# Patient Record
Sex: FEMALE | Race: WHITE | Employment: UNEMPLOYED | URBAN - METROPOLITAN AREA
[De-identification: names, ages, dates, MRNs, and addresses within clinical notes are randomized per-mention and may not be internally consistent; named-entity substitution may affect disease eponyms.]

---

## 2017-01-23 ENCOUNTER — OFFICE VISIT (OUTPATIENT)
Dept: FAMILY MEDICINE CLINIC | Age: 62
End: 2017-01-23

## 2017-01-23 VITALS
HEIGHT: 61 IN | OXYGEN SATURATION: 95 % | HEART RATE: 72 BPM | RESPIRATION RATE: 20 BRPM | DIASTOLIC BLOOD PRESSURE: 66 MMHG | SYSTOLIC BLOOD PRESSURE: 94 MMHG | TEMPERATURE: 96.5 F

## 2017-01-23 DIAGNOSIS — J20.9 ACUTE BRONCHITIS, UNSPECIFIED ORGANISM: ICD-10-CM

## 2017-01-23 DIAGNOSIS — E13.9 DIABETES 1.5, MANAGED AS TYPE 2 (HCC): ICD-10-CM

## 2017-01-23 DIAGNOSIS — I10 ESSENTIAL HYPERTENSION: ICD-10-CM

## 2017-01-23 DIAGNOSIS — J01.00 ACUTE NON-RECURRENT MAXILLARY SINUSITIS: Primary | ICD-10-CM

## 2017-01-23 DIAGNOSIS — Z12.11 COLON CANCER SCREENING: ICD-10-CM

## 2017-01-23 PROCEDURE — 82274 ASSAY TEST FOR BLOOD FECAL: CPT | Performed by: NURSE PRACTITIONER

## 2017-01-23 PROCEDURE — 99213 OFFICE O/P EST LOW 20 MIN: CPT | Performed by: NURSE PRACTITIONER

## 2017-01-23 RX ORDER — GUAIFENESIN AND CODEINE PHOSPHATE 100; 10 MG/5ML; MG/5ML
5 SOLUTION ORAL EVERY 4 HOURS PRN
Qty: 120 ML | Refills: 0 | Status: SHIPPED | OUTPATIENT
Start: 2017-01-23 | End: 2017-01-30

## 2017-01-23 RX ORDER — CLARITHROMYCIN 500 MG/1
500 TABLET, COATED ORAL 2 TIMES DAILY
Qty: 20 TABLET | Refills: 0 | Status: SHIPPED | OUTPATIENT
Start: 2017-01-23 | End: 2017-02-02

## 2017-01-23 RX ORDER — ALBUTEROL SULFATE 90 UG/1
2 AEROSOL, METERED RESPIRATORY (INHALATION) EVERY 6 HOURS PRN
Qty: 1 INHALER | Refills: 3 | Status: SHIPPED | OUTPATIENT
Start: 2017-01-23 | End: 2020-11-09 | Stop reason: SDUPTHER

## 2017-02-09 ENCOUNTER — TELEPHONE (OUTPATIENT)
Dept: FAMILY MEDICINE CLINIC | Age: 62
End: 2017-02-09

## 2017-02-16 ENCOUNTER — TELEPHONE (OUTPATIENT)
Dept: FAMILY MEDICINE CLINIC | Age: 62
End: 2017-02-16

## 2017-02-17 RX ORDER — BENZONATATE 100 MG/1
100 CAPSULE ORAL 3 TIMES DAILY PRN
Qty: 30 CAPSULE | Refills: 0 | Status: SHIPPED | OUTPATIENT
Start: 2017-02-17 | End: 2018-05-17 | Stop reason: SDUPTHER

## 2017-03-02 RX ORDER — HYDROCHLOROTHIAZIDE 12.5 MG/1
CAPSULE, GELATIN COATED ORAL
Qty: 30 CAPSULE | Refills: 0 | Status: SHIPPED | OUTPATIENT
Start: 2017-03-02 | End: 2017-03-31 | Stop reason: SDUPTHER

## 2017-04-02 RX ORDER — METOPROLOL TARTRATE 100 MG/1
TABLET ORAL
Qty: 60 TABLET | Refills: 0 | Status: SHIPPED | OUTPATIENT
Start: 2017-04-02 | End: 2017-05-04 | Stop reason: SDUPTHER

## 2017-04-02 RX ORDER — HYDROCHLOROTHIAZIDE 12.5 MG/1
CAPSULE, GELATIN COATED ORAL
Qty: 30 CAPSULE | Refills: 0 | Status: SHIPPED | OUTPATIENT
Start: 2017-04-02 | End: 2017-05-04 | Stop reason: SDUPTHER

## 2017-05-04 ENCOUNTER — TELEPHONE (OUTPATIENT)
Dept: FAMILY MEDICINE CLINIC | Age: 62
End: 2017-05-04

## 2017-06-11 RX ORDER — METOPROLOL TARTRATE 100 MG/1
TABLET ORAL
Qty: 60 TABLET | Refills: 0 | Status: SHIPPED | OUTPATIENT
Start: 2017-06-11 | End: 2017-07-03 | Stop reason: SDUPTHER

## 2017-06-11 RX ORDER — HYDROCHLOROTHIAZIDE 12.5 MG/1
CAPSULE, GELATIN COATED ORAL
Qty: 30 CAPSULE | Refills: 0 | Status: SHIPPED | OUTPATIENT
Start: 2017-06-11 | End: 2017-07-03 | Stop reason: SDUPTHER

## 2017-06-26 ENCOUNTER — HOSPITAL ENCOUNTER (OUTPATIENT)
Dept: LAB | Age: 62
Discharge: HOME | End: 2017-06-26

## 2017-06-26 ENCOUNTER — OFFICE VISIT (OUTPATIENT)
Dept: FAMILY MEDICINE CLINIC | Age: 62
End: 2017-06-26

## 2017-06-26 VITALS
HEART RATE: 76 BPM | HEIGHT: 61 IN | WEIGHT: 179.6 LBS | BODY MASS INDEX: 33.91 KG/M2 | DIASTOLIC BLOOD PRESSURE: 78 MMHG | RESPIRATION RATE: 18 BRPM | TEMPERATURE: 97.9 F | SYSTOLIC BLOOD PRESSURE: 130 MMHG | OXYGEN SATURATION: 96 %

## 2017-06-26 DIAGNOSIS — J30.9 ALLERGIC RHINITIS, UNSPECIFIED ALLERGIC RHINITIS TRIGGER, UNSPECIFIED RHINITIS SEASONALITY: Chronic | ICD-10-CM

## 2017-06-26 DIAGNOSIS — E11.9 TYPE 2 DIABETES MELLITUS WITHOUT COMPLICATION, WITHOUT LONG-TERM CURRENT USE OF INSULIN (HCC): Chronic | ICD-10-CM

## 2017-06-26 DIAGNOSIS — K76.0 FATTY INFILTRATION OF LIVER: Chronic | ICD-10-CM

## 2017-06-26 DIAGNOSIS — I10 ESSENTIAL HYPERTENSION: Chronic | ICD-10-CM

## 2017-06-26 DIAGNOSIS — L03.211 CELLULITIS OF FACE: Primary | ICD-10-CM

## 2017-06-26 LAB
ALBUMIN SERPL-MCNC: 4.7 G/DL (ref 3.9–4.9)
ALP BLD-CCNC: 79 U/L (ref 40–130)
ALT SERPL-CCNC: 91 U/L (ref 0–33)
ANION GAP SERPL CALCULATED.3IONS-SCNC: 15 MEQ/L (ref 7–13)
AST SERPL-CCNC: 54 U/L (ref 0–35)
BASOPHILS ABSOLUTE: 0.1 K/UL (ref 0–0.2)
BASOPHILS RELATIVE PERCENT: 0.6 %
BILIRUB SERPL-MCNC: 0.5 MG/DL (ref 0–1.2)
BUN BLDV-MCNC: 14 MG/DL (ref 6–20)
CALCIUM SERPL-MCNC: 9.3 MG/DL (ref 8.6–10.2)
CHLORIDE BLD-SCNC: 97 MEQ/L (ref 98–107)
CHOLESTEROL, TOTAL: 209 MG/DL (ref 0–199)
CO2: 25 MEQ/L (ref 22–29)
CREAT SERPL-MCNC: 0.74 MG/DL (ref 0.5–0.9)
CREATININE URINE: 103.4 MG/DL
EOSINOPHILS ABSOLUTE: 0.6 K/UL (ref 0–0.7)
EOSINOPHILS RELATIVE PERCENT: 6.5 %
GFR AFRICAN AMERICAN: >60
GFR NON-AFRICAN AMERICAN: >60
GLOBULIN: 2.4 G/DL (ref 2.3–3.5)
GLUCOSE BLD-MCNC: 190 MG/DL (ref 74–109)
HBA1C MFR BLD: 7.6 % (ref 4.8–5.9)
HCT VFR BLD CALC: 42.8 % (ref 37–47)
HDLC SERPL-MCNC: 53 MG/DL (ref 40–59)
HEMOGLOBIN: 13.8 G/DL (ref 12–16)
LDL CHOLESTEROL CALCULATED: 122 MG/DL (ref 0–129)
LYMPHOCYTES ABSOLUTE: 0.8 K/UL (ref 1–4.8)
LYMPHOCYTES RELATIVE PERCENT: 9 %
MCH RBC QN AUTO: 28.6 PG (ref 27–31.3)
MCHC RBC AUTO-ENTMCNC: 32.2 % (ref 33–37)
MCV RBC AUTO: 88.7 FL (ref 82–100)
MICROALBUMIN UR-MCNC: <1.2 MG/DL
MICROALBUMIN/CREAT UR-RTO: NORMAL MG/G (ref 0–30)
MONOCYTES ABSOLUTE: 0.5 K/UL (ref 0.2–0.8)
MONOCYTES RELATIVE PERCENT: 5.5 %
NEUTROPHILS ABSOLUTE: 6.8 K/UL (ref 1.4–6.5)
NEUTROPHILS RELATIVE PERCENT: 78.4 %
PDW BLD-RTO: 15.4 % (ref 11.5–14.5)
PLATELET # BLD: 221 K/UL (ref 130–400)
POTASSIUM SERPL-SCNC: 4.2 MEQ/L (ref 3.5–5.1)
RBC # BLD: 4.83 M/UL (ref 4.2–5.4)
SLIDE REVIEW: ABNORMAL
SODIUM BLD-SCNC: 137 MEQ/L (ref 132–144)
TOTAL PROTEIN: 7.1 G/DL (ref 6.4–8.1)
TRIGL SERPL-MCNC: 170 MG/DL (ref 0–200)
WBC # BLD: 8.6 K/UL (ref 4.8–10.8)

## 2017-06-26 PROCEDURE — 36415 COLL VENOUS BLD VENIPUNCTURE: CPT

## 2017-06-26 PROCEDURE — 80061 LIPID PANEL: CPT

## 2017-06-26 PROCEDURE — 85025 COMPLETE CBC W/AUTO DIFF WBC: CPT

## 2017-06-26 PROCEDURE — 83036 HEMOGLOBIN GLYCOSYLATED A1C: CPT

## 2017-06-26 PROCEDURE — 82570 ASSAY OF URINE CREATININE: CPT

## 2017-06-26 PROCEDURE — 80053 COMPREHEN METABOLIC PANEL: CPT

## 2017-06-26 PROCEDURE — 82043 UR ALBUMIN QUANTITATIVE: CPT

## 2017-06-26 PROCEDURE — 99214 OFFICE O/P EST MOD 30 MIN: CPT | Performed by: FAMILY MEDICINE

## 2017-06-26 RX ORDER — SULFAMETHOXAZOLE AND TRIMETHOPRIM 800; 160 MG/1; MG/1
1 TABLET ORAL 2 TIMES DAILY
Qty: 20 TABLET | Refills: 0 | Status: SHIPPED | OUTPATIENT
Start: 2017-06-26 | End: 2017-07-06

## 2017-06-26 RX ORDER — IPRATROPIUM BROMIDE 42 UG/1
2 SPRAY, METERED NASAL 3 TIMES DAILY
Qty: 1 BOTTLE | Refills: 3 | Status: SHIPPED | OUTPATIENT
Start: 2017-06-26 | End: 2022-02-03

## 2017-06-26 ASSESSMENT — ENCOUNTER SYMPTOMS
SHORTNESS OF BREATH: 0
SINUS PRESSURE: 1
SORE THROAT: 1
CHEST TIGHTNESS: 0
EYE DISCHARGE: 0
NAUSEA: 1
VOMITING: 0
COUGH: 1
DIARRHEA: 0
ABDOMINAL PAIN: 0
WHEEZING: 0

## 2017-06-26 ASSESSMENT — PATIENT HEALTH QUESTIONNAIRE - PHQ9
SUM OF ALL RESPONSES TO PHQ QUESTIONS 1-9: 2
2. FEELING DOWN, DEPRESSED OR HOPELESS: 1
SUM OF ALL RESPONSES TO PHQ9 QUESTIONS 1 & 2: 2
1. LITTLE INTEREST OR PLEASURE IN DOING THINGS: 1

## 2017-06-28 DIAGNOSIS — E78.00 PURE HYPERCHOLESTEROLEMIA: Chronic | ICD-10-CM

## 2017-06-28 DIAGNOSIS — E11.9 TYPE 2 DIABETES MELLITUS WITHOUT COMPLICATION, WITHOUT LONG-TERM CURRENT USE OF INSULIN (HCC): Primary | Chronic | ICD-10-CM

## 2017-06-28 PROBLEM — E78.5 HYPERLIPIDEMIA: Chronic | Status: ACTIVE | Noted: 2017-06-28

## 2017-06-28 RX ORDER — ATORVASTATIN CALCIUM 10 MG/1
TABLET, FILM COATED ORAL
Qty: 12 TABLET | Refills: 5 | Status: SHIPPED | OUTPATIENT
Start: 2017-06-28 | End: 2017-09-25 | Stop reason: ALTCHOICE

## 2017-06-28 RX ORDER — GLIPIZIDE 2.5 MG/1
2.5 TABLET, EXTENDED RELEASE ORAL DAILY
Qty: 30 TABLET | Refills: 3 | Status: SHIPPED | OUTPATIENT
Start: 2017-06-28 | End: 2017-11-13 | Stop reason: SDUPTHER

## 2017-07-03 ENCOUNTER — OFFICE VISIT (OUTPATIENT)
Dept: FAMILY MEDICINE CLINIC | Age: 62
End: 2017-07-03

## 2017-07-03 VITALS
WEIGHT: 175 LBS | SYSTOLIC BLOOD PRESSURE: 122 MMHG | HEART RATE: 102 BPM | OXYGEN SATURATION: 92 % | BODY MASS INDEX: 33.07 KG/M2 | DIASTOLIC BLOOD PRESSURE: 76 MMHG

## 2017-07-03 DIAGNOSIS — E78.00 PURE HYPERCHOLESTEROLEMIA: Chronic | ICD-10-CM

## 2017-07-03 DIAGNOSIS — E11.9 TYPE 2 DIABETES MELLITUS WITHOUT COMPLICATION, WITHOUT LONG-TERM CURRENT USE OF INSULIN (HCC): Chronic | ICD-10-CM

## 2017-07-03 DIAGNOSIS — I10 ESSENTIAL HYPERTENSION: Chronic | ICD-10-CM

## 2017-07-03 DIAGNOSIS — H60.543 ECZEMA OF EXTERNAL EAR, BILATERAL: ICD-10-CM

## 2017-07-03 DIAGNOSIS — L03.211 CELLULITIS OF FACE: Primary | ICD-10-CM

## 2017-07-03 PROBLEM — H60.549 ECZEMA OF EXTERNAL EAR: Chronic | Status: ACTIVE | Noted: 2017-07-03

## 2017-07-03 PROBLEM — H60.549 ECZEMA OF EXTERNAL EAR: Status: ACTIVE | Noted: 2017-07-03

## 2017-07-03 PROCEDURE — 99214 OFFICE O/P EST MOD 30 MIN: CPT | Performed by: FAMILY MEDICINE

## 2017-07-03 RX ORDER — MOMETASONE FUROATE 1 MG/G
OINTMENT TOPICAL
Qty: 45 G | Refills: 3 | Status: SHIPPED | OUTPATIENT
Start: 2017-07-03 | End: 2019-01-31 | Stop reason: SDUPTHER

## 2017-07-03 RX ORDER — HYDROCHLOROTHIAZIDE 12.5 MG/1
12.5 CAPSULE, GELATIN COATED ORAL EVERY MORNING
Qty: 30 CAPSULE | Refills: 5 | Status: SHIPPED | OUTPATIENT
Start: 2017-07-03 | End: 2021-02-15 | Stop reason: SDUPTHER

## 2017-07-03 RX ORDER — METOPROLOL TARTRATE 100 MG/1
100 TABLET ORAL 2 TIMES DAILY
Qty: 60 TABLET | Refills: 5 | Status: SHIPPED | OUTPATIENT
Start: 2017-07-03 | End: 2018-05-17 | Stop reason: SDUPTHER

## 2017-07-03 ASSESSMENT — ENCOUNTER SYMPTOMS
DIARRHEA: 0
COUGH: 0
ABDOMINAL PAIN: 0
VOMITING: 0
CHEST TIGHTNESS: 0
NAUSEA: 0
WHEEZING: 0
SHORTNESS OF BREATH: 0

## 2017-07-28 ENCOUNTER — TELEPHONE (OUTPATIENT)
Dept: FAMILY MEDICINE CLINIC | Age: 62
End: 2017-07-28

## 2017-07-28 DIAGNOSIS — R05.9 COUGH: Primary | ICD-10-CM

## 2017-07-28 RX ORDER — BROMPHENIRAMINE MALEATE, PSEUDOEPHEDRINE HYDROCHLORIDE, AND DEXTROMETHORPHAN HYDROBROMIDE 2; 30; 10 MG/5ML; MG/5ML; MG/5ML
10 SYRUP ORAL EVERY 4 HOURS PRN
Qty: 473 ML | Refills: 0 | Status: SHIPPED | OUTPATIENT
Start: 2017-07-28 | End: 2017-09-25 | Stop reason: ALTCHOICE

## 2017-09-21 ENCOUNTER — OFFICE VISIT (OUTPATIENT)
Dept: FAMILY MEDICINE CLINIC | Age: 62
End: 2017-09-21

## 2017-09-21 VITALS
HEIGHT: 61 IN | WEIGHT: 178.5 LBS | BODY MASS INDEX: 33.7 KG/M2 | DIASTOLIC BLOOD PRESSURE: 80 MMHG | TEMPERATURE: 97.3 F | SYSTOLIC BLOOD PRESSURE: 140 MMHG | RESPIRATION RATE: 14 BRPM | HEART RATE: 76 BPM | OXYGEN SATURATION: 94 %

## 2017-09-21 DIAGNOSIS — H60.543 ECZEMA OF EXTERNAL EAR, BILATERAL: Primary | ICD-10-CM

## 2017-09-21 PROCEDURE — 99212 OFFICE O/P EST SF 10 MIN: CPT | Performed by: PHYSICIAN ASSISTANT

## 2017-09-21 RX ORDER — NYSTATIN 100000 U/G
CREAM TOPICAL
Qty: 1 TUBE | Refills: 0 | Status: SHIPPED | OUTPATIENT
Start: 2017-09-21 | End: 2020-11-09 | Stop reason: ALTCHOICE

## 2017-09-25 ENCOUNTER — OFFICE VISIT (OUTPATIENT)
Dept: FAMILY MEDICINE CLINIC | Age: 62
End: 2017-09-25

## 2017-09-25 VITALS
BODY MASS INDEX: 32.85 KG/M2 | TEMPERATURE: 98 F | SYSTOLIC BLOOD PRESSURE: 128 MMHG | HEIGHT: 61 IN | DIASTOLIC BLOOD PRESSURE: 77 MMHG | OXYGEN SATURATION: 98 % | WEIGHT: 174 LBS | HEART RATE: 77 BPM | RESPIRATION RATE: 20 BRPM

## 2017-09-25 DIAGNOSIS — L08.9 SKIN INFECTION: ICD-10-CM

## 2017-09-25 DIAGNOSIS — L24.9 IRRITANT CONTACT DERMATITIS, UNSPECIFIED TRIGGER: Primary | ICD-10-CM

## 2017-09-25 PROCEDURE — 99213 OFFICE O/P EST LOW 20 MIN: CPT | Performed by: NURSE PRACTITIONER

## 2017-09-25 RX ORDER — SULFAMETHOXAZOLE AND TRIMETHOPRIM 800; 160 MG/1; MG/1
1 TABLET ORAL 2 TIMES DAILY
Qty: 20 TABLET | Refills: 0 | Status: SHIPPED | OUTPATIENT
Start: 2017-09-25 | End: 2017-10-17 | Stop reason: SDUPTHER

## 2017-09-25 RX ORDER — TRIAMCINOLONE ACETONIDE 1 MG/G
CREAM TOPICAL
Qty: 45 G | Refills: 1 | Status: SHIPPED | OUTPATIENT
Start: 2017-09-25 | End: 2020-11-09 | Stop reason: ALTCHOICE

## 2017-09-25 RX ORDER — GREEN TEA/HOODIA GORDONII 315-12.5MG
1 CAPSULE ORAL 2 TIMES DAILY
Qty: 60 TABLET | Refills: 0 | Status: SHIPPED | OUTPATIENT
Start: 2017-09-25

## 2017-09-25 RX ORDER — PREDNISONE 20 MG/1
TABLET ORAL
Qty: 20 TABLET | Refills: 0 | Status: SHIPPED | OUTPATIENT
Start: 2017-09-25 | End: 2017-10-05

## 2017-10-17 DIAGNOSIS — L08.9 SKIN INFECTION: ICD-10-CM

## 2017-10-18 RX ORDER — SULFAMETHOXAZOLE AND TRIMETHOPRIM 800; 160 MG/1; MG/1
1 TABLET ORAL 2 TIMES DAILY
Qty: 20 TABLET | Refills: 0 | Status: SHIPPED | OUTPATIENT
Start: 2017-10-18 | End: 2017-10-28

## 2017-11-13 DIAGNOSIS — E11.9 TYPE 2 DIABETES MELLITUS WITHOUT COMPLICATION, WITHOUT LONG-TERM CURRENT USE OF INSULIN (HCC): Chronic | ICD-10-CM

## 2017-11-13 RX ORDER — GLIPIZIDE 2.5 MG/1
TABLET, EXTENDED RELEASE ORAL
Qty: 30 TABLET | Refills: 2 | Status: SHIPPED | OUTPATIENT
Start: 2017-11-13 | End: 2018-03-03 | Stop reason: SDUPTHER

## 2017-12-18 ENCOUNTER — TELEPHONE (OUTPATIENT)
Dept: FAMILY MEDICINE CLINIC | Age: 62
End: 2017-12-18

## 2017-12-18 DIAGNOSIS — G89.29 CHRONIC RIGHT SHOULDER PAIN: Primary | ICD-10-CM

## 2017-12-18 DIAGNOSIS — M25.511 CHRONIC RIGHT SHOULDER PAIN: Primary | ICD-10-CM

## 2017-12-18 NOTE — TELEPHONE ENCOUNTER
Pt is wanting an order for xray of the rt shoulder,  She has an appointment next month to talk to you about it but wants to have xray first.

## 2017-12-19 ENCOUNTER — HOSPITAL ENCOUNTER (OUTPATIENT)
Age: 62
Discharge: HOME | End: 2017-12-19

## 2017-12-19 ENCOUNTER — HOSPITAL ENCOUNTER (OUTPATIENT)
Dept: GENERAL RADIOLOGY | Age: 62
Discharge: HOME | End: 2017-12-19

## 2017-12-19 DIAGNOSIS — G89.29 CHRONIC RIGHT SHOULDER PAIN: ICD-10-CM

## 2017-12-19 DIAGNOSIS — M25.511 CHRONIC RIGHT SHOULDER PAIN: ICD-10-CM

## 2017-12-19 PROCEDURE — 73030 X-RAY EXAM OF SHOULDER: CPT

## 2017-12-21 NOTE — PROGRESS NOTES
Please notify [facility] that shoulder x-ray shows an abnormal area that may be a form of tendinitis or possible bone spur. Let me know if she would like referral to orthopedics and I can order. Otherwise, she can follow up with me.

## 2017-12-27 ENCOUNTER — TELEPHONE (OUTPATIENT)
Dept: FAMILY MEDICINE CLINIC | Age: 62
End: 2017-12-27

## 2017-12-28 NOTE — TELEPHONE ENCOUNTER
Patient called back and said she cant take any anti inflammatory they make her sick and this should be in her chart that she cannot take these she does have muscle relaxer Flexeril to help her sleep wanted to know if this is fine to take and if she could go get something at the store for arthritis like tylenol. cp

## 2017-12-28 NOTE — TELEPHONE ENCOUNTER
Yes the flexeril is fine to take. Tylenol is fine to take she can take up to 4,000 mg per day. I can add NSAIDS to her allergy list if she would like.

## 2018-01-02 ENCOUNTER — APPOINTMENT (OUTPATIENT)
Dept: GENERAL RADIOLOGY | Age: 63
End: 2018-01-02

## 2018-01-02 ENCOUNTER — HOSPITAL ENCOUNTER (EMERGENCY)
Age: 63
Discharge: HOME | End: 2018-01-02
Attending: EMERGENCY MEDICINE

## 2018-01-02 VITALS
OXYGEN SATURATION: 95 % | WEIGHT: 170 LBS | DIASTOLIC BLOOD PRESSURE: 74 MMHG | TEMPERATURE: 98.4 F | SYSTOLIC BLOOD PRESSURE: 147 MMHG | BODY MASS INDEX: 32.1 KG/M2 | HEART RATE: 85 BPM | HEIGHT: 61 IN | RESPIRATION RATE: 16 BRPM

## 2018-01-02 DIAGNOSIS — S93.402A MODERATE LEFT ANKLE SPRAIN, INITIAL ENCOUNTER: Primary | ICD-10-CM

## 2018-01-02 PROCEDURE — 99283 EMERGENCY DEPT VISIT LOW MDM: CPT

## 2018-01-02 PROCEDURE — 73610 X-RAY EXAM OF ANKLE: CPT

## 2018-01-02 ASSESSMENT — PAIN DESCRIPTION - DESCRIPTORS
DESCRIPTORS: ACHING;BURNING
DESCRIPTORS: ACHING;BURNING

## 2018-01-02 ASSESSMENT — PAIN DESCRIPTION - ORIENTATION
ORIENTATION: LEFT
ORIENTATION: LEFT

## 2018-01-02 ASSESSMENT — PAIN DESCRIPTION - LOCATION
LOCATION: ANKLE
LOCATION: ANKLE

## 2018-01-02 ASSESSMENT — PAIN DESCRIPTION - FREQUENCY
FREQUENCY: INTERMITTENT
FREQUENCY: INTERMITTENT

## 2018-01-02 ASSESSMENT — PAIN DESCRIPTION - PAIN TYPE
TYPE: ACUTE PAIN
TYPE: ACUTE PAIN

## 2018-01-02 ASSESSMENT — PAIN SCALES - GENERAL
PAINLEVEL_OUTOF10: 6
PAINLEVEL_OUTOF10: 6

## 2018-01-02 NOTE — ED NOTES
Discharge instructions given. Patient verbalizes understanding and denies any questions. Patient discharged via wheelchair accompanied by spouse.         Rojas Brown RN  01/02/18 [id number]

## 2018-01-02 NOTE — ED PROVIDER NOTES
ALBUTEROL SULFATE HFA (PROVENTIL HFA) 108 (90 BASE) MCG/ACT INHALER    Inhale 2 puffs into the lungs every 6 hours as needed for Wheezing    CHOLECALCIFEROL (VITAMIN D3) 5000 UNITS TABS    Take 5,000 Units by mouth daily    CYCLOBENZAPRINE (FLEXERIL) 10 MG TABLET    Take 1 tablet by mouth 2 times daily as needed for Muscle spasms    FISH OIL    1,000 mg by Does not apply route daily. GLIPIZIDE XL 2.5 MG EXTENDED RELEASE TABLET    TAKE ONE TABLET BY MOUTH DAILY    HYDROCHLOROTHIAZIDE (MICROZIDE) 12.5 MG CAPSULE    Take 1 capsule by mouth every morning    IBUPROFEN (ADVIL;MOTRIN) 800 MG TABLET    Take 1 tablet by mouth every 6 hours as needed for Pain    IPRATROPIUM (ATROVENT) 0.06 % NASAL SPRAY    2 sprays by Nasal route 3 times daily    LACTOBACILLUS (PROBIOTIC ACIDOPHILUS) TABS    Take 1 tablet by mouth 2 times daily    METFORMIN (GLUCOPHAGE) 500 MG TABLET    Take 2 tablets by mouth 2 times daily (with meals)    METOPROLOL (LOPRESSOR) 100 MG TABLET    Take 1 tablet by mouth 2 times daily    MOMETASONE (ASMANEX 30 METERED DOSES) 220 MCG/INH INHALER    Inhale 1 puff into the lungs daily    MOMETASONE (ELOCON) 0.1 % OINTMENT    Apply topically daily. NYSTATIN (MYCOSTATIN) 779604 UNIT/GM CREAM    Apply topically 2 times daily. SERTRALINE (ZOLOFT) 100 MG TABLET    TAKE 1/2 TO 1 TABLET BY MOUTH EVERY DAY    TRAMADOL (ULTRAM) 50 MG TABLET    Take 50 mg by mouth every 6 hours as needed for Pain    TRIAMCINOLONE (KENALOG) 0.1 % CREAM    Apply topically 2 times daily. ALLERGIES     Pcn [penicillins];  Penicillin g; Statins; and Nsaids    FAMILY HISTORY       Family History   Problem Relation Age of Onset    Adopted: Yes          SOCIAL HISTORY       Social History     Social History    Marital status:      Spouse name: N/A    Number of children: N/A    Years of education: N/A     Social History Main Topics    Smoking status: Former Smoker     Packs/day: 2.00     Years: 30.00     Quit date: 1/1/2010    Smokeless tobacco: Never Used    Alcohol use No    Drug use: No    Sexual activity: Not Asked     Other Topics Concern    None     Social History Narrative    None       SCREENINGS             PHYSICAL EXAM    (up to 7 for level 4, 8 or more for level 5)     ED Triage Vitals   BP Temp Temp Source Pulse Resp SpO2 Height Weight   01/02/18 1232 01/02/18 1236 01/02/18 1236 01/02/18 1232 01/02/18 1232 01/02/18 1232 01/02/18 1236 01/02/18 1236   (!) 164/76 98.2 °F (36.8 °C) Oral 92 16 94 % 5' 1\" (1.549 m) 170 lb (77.1 kg)       Physical Exam  This is a 12year-old female without distress. Patient has swelling and pain lateral malleolar region. Minimal pain to the dorsal foot but not related fifth metatarsal.  No pain medial.  No pain to the Achilles. No skin wounds. No visible bruising. Neurologic and vascular status intact. No pain above the ankle mortise. DIAGNOSTIC RESULTS     EKG: All EKG's are interpreted by the Emergency Department Physician who either signs or Co-signs this chart in the absence of a cardiologist.        RADIOLOGY:   Non-plain film images such as CT, Ultrasound and MRI are read by the radiologist. Plain radiographic images are visualized and preliminarily interpreted by the emergency physician with the below findings:    X-ray of the left ankle reveals a small loose body near the talus. Patient states she is aware of previous injury. This appears chronic without acute fracture as interpreted by myself. Interpretation per the Radiologist below, if available at the time of this note:    XR ANKLE LEFT (MIN 3 VIEWS)    (Results Pending)         ED BEDSIDE ULTRASOUND:   Performed by ED Physician - none    LABS:  Labs Reviewed - No data to display    All other labs were within normal range or not returned as of this dictation.     EMERGENCY DEPARTMENT COURSE and DIFFERENTIAL DIAGNOSIS/MDM:   Vitals:    Vitals:    01/02/18 1232 01/02/18 1236   BP: (!) 164/76    Pulse: 92 Resp: 16    Temp:  98.2 °F (36.8 °C)   TempSrc:  Oral   SpO2: 94%    Weight:  170 lb (77.1 kg)   Height:  5' 1\" (1.549 m)       Patient has ankle sprain. She has asked her home which utilize if needed. Ace wrap placed in the ED. I stressed elevation, ice and rest.    MDM    CRITICAL CARE TIME   Total Critical Care time was  minutes, excluding separately reportable procedures. There was a high probability of clinically significant/life threatening deterioration in the patient's condition which required my urgent intervention. CONSULTS:  None    PROCEDURES:  Unless otherwise noted below, none     Procedures    FINAL IMPRESSION      1.  Moderate left ankle sprain, initial encounter          DISPOSITION/PLAN   DISPOSITION Decision To Discharge 01/02/2018 01:31:57 PM      PATIENT REFERRED TO:  Joan Estrada CNP  [facility] [address]  [phone]      As needed      DISCHARGE MEDICATIONS:  New Prescriptions    No medications on file          (Please note that portions of this note were completed with a voice recognition program.  Efforts were made to edit the dictations but occasionally words are mis-transcribed.)    Briseyda Lama MD (electronically signed)  Attending Emergency Physician          Byron Kahn MD  01/02/18 0772

## 2018-01-04 RX ORDER — SERTRALINE HYDROCHLORIDE 100 MG/1
TABLET, FILM COATED ORAL
Qty: 90 TABLET | Refills: 0 | Status: SHIPPED | OUTPATIENT
Start: 2018-01-04 | End: 2019-01-31 | Stop reason: SDUPTHER

## 2018-01-04 RX ORDER — SERTRALINE HYDROCHLORIDE 100 MG/1
TABLET, FILM COATED ORAL
Qty: 90 TABLET | Refills: 2 | Status: SHIPPED | OUTPATIENT
Start: 2018-01-04 | End: 2018-05-17 | Stop reason: SDUPTHER

## 2018-01-04 RX ORDER — METOPROLOL TARTRATE 100 MG/1
TABLET ORAL
Qty: 60 TABLET | Refills: 0 | Status: SHIPPED | OUTPATIENT
Start: 2018-01-04 | End: 2018-03-02 | Stop reason: SDUPTHER

## 2018-01-04 RX ORDER — HYDROCHLOROTHIAZIDE 12.5 MG/1
CAPSULE, GELATIN COATED ORAL
Qty: 30 CAPSULE | Refills: 0 | Status: SHIPPED | OUTPATIENT
Start: 2018-01-04 | End: 2018-03-02 | Stop reason: SDUPTHER

## 2018-01-25 ENCOUNTER — OFFICE VISIT (OUTPATIENT)
Dept: FAMILY MEDICINE CLINIC | Age: 63
End: 2018-01-25

## 2018-01-25 VITALS
SYSTOLIC BLOOD PRESSURE: 124 MMHG | HEART RATE: 86 BPM | BODY MASS INDEX: 33.99 KG/M2 | HEIGHT: 61 IN | DIASTOLIC BLOOD PRESSURE: 78 MMHG | RESPIRATION RATE: 14 BRPM | OXYGEN SATURATION: 92 % | WEIGHT: 180 LBS | TEMPERATURE: 98.4 F

## 2018-01-25 DIAGNOSIS — G89.29 CHRONIC RIGHT SHOULDER PAIN: Primary | ICD-10-CM

## 2018-01-25 DIAGNOSIS — M25.572 CHRONIC PAIN OF LEFT ANKLE: ICD-10-CM

## 2018-01-25 DIAGNOSIS — G89.29 CHRONIC PAIN OF LEFT ANKLE: ICD-10-CM

## 2018-01-25 DIAGNOSIS — M25.511 CHRONIC RIGHT SHOULDER PAIN: Primary | ICD-10-CM

## 2018-01-25 DIAGNOSIS — Z12.31 ENCOUNTER FOR SCREENING MAMMOGRAM FOR BREAST CANCER: ICD-10-CM

## 2018-01-25 DIAGNOSIS — E11.9 TYPE 2 DIABETES MELLITUS WITHOUT COMPLICATION, WITHOUT LONG-TERM CURRENT USE OF INSULIN (HCC): Chronic | ICD-10-CM

## 2018-01-25 DIAGNOSIS — Z12.11 SCREENING FOR COLON CANCER: ICD-10-CM

## 2018-01-25 PROCEDURE — 99214 OFFICE O/P EST MOD 30 MIN: CPT | Performed by: NURSE PRACTITIONER

## 2018-01-25 RX ORDER — TRAMADOL HYDROCHLORIDE 50 MG/1
50 TABLET ORAL EVERY 6 HOURS PRN
Qty: 50 TABLET | Refills: 0 | Status: SHIPPED | OUTPATIENT
Start: 2018-01-25 | End: 2018-02-24

## 2018-02-06 ENCOUNTER — HOSPITAL ENCOUNTER (OUTPATIENT)
Dept: MRI IMAGING | Age: 63
Discharge: HOME | End: 2018-02-08

## 2018-02-06 DIAGNOSIS — G89.29 CHRONIC RIGHT SHOULDER PAIN: ICD-10-CM

## 2018-02-06 DIAGNOSIS — M25.511 CHRONIC RIGHT SHOULDER PAIN: ICD-10-CM

## 2018-02-06 PROCEDURE — 73221 MRI JOINT UPR EXTREM W/O DYE: CPT

## 2018-02-07 NOTE — PROGRESS NOTES
Please notify Bia Back that MRI of the shoulder is abnormal. Does she have any appointment with orthopedic coming up? Shoulder may require surgery and so a referral is advised.

## 2018-02-08 ENCOUNTER — TELEPHONE (OUTPATIENT)
Dept: FAMILY MEDICINE CLINIC | Age: 63
End: 2018-02-08

## 2018-02-08 DIAGNOSIS — R93.6 ABNORMAL MRI, SHOULDER: Primary | ICD-10-CM

## 2018-02-08 NOTE — TELEPHONE ENCOUNTER
----- Message from Ag Barrett sent at 2/8/2018  9:09 AM EST -----  Patient called back, advised patient of result, patient would like to be seen in [place] by orthopedic doctor, requested to speak to clinical staff.

## 2018-02-09 NOTE — TELEPHONE ENCOUNTER
Patient is aware. I have given her information needed to set up appointment. I have faxed over referral and information to Dr. George Candelario.

## 2018-02-23 DIAGNOSIS — M25.511 CHRONIC RIGHT SHOULDER PAIN: Primary | ICD-10-CM

## 2018-02-23 DIAGNOSIS — G89.29 CHRONIC RIGHT SHOULDER PAIN: Primary | ICD-10-CM

## 2018-02-23 RX ORDER — TRAMADOL HYDROCHLORIDE 50 MG/1
50 TABLET ORAL EVERY 6 HOURS PRN
Qty: 90 TABLET | Refills: 0 | Status: SHIPPED | OUTPATIENT
Start: 2018-02-23 | End: 2018-03-25

## 2018-03-02 RX ORDER — HYDROCHLOROTHIAZIDE 12.5 MG/1
CAPSULE, GELATIN COATED ORAL
Qty: 30 CAPSULE | Refills: 0 | Status: SHIPPED | OUTPATIENT
Start: 2018-03-02 | End: 2018-04-02 | Stop reason: SDUPTHER

## 2018-03-02 RX ORDER — METOPROLOL TARTRATE 100 MG/1
TABLET ORAL
Qty: 60 TABLET | Refills: 0 | Status: SHIPPED | OUTPATIENT
Start: 2018-03-02 | End: 2018-04-02 | Stop reason: SDUPTHER

## 2018-03-03 DIAGNOSIS — E11.9 TYPE 2 DIABETES MELLITUS WITHOUT COMPLICATION, WITHOUT LONG-TERM CURRENT USE OF INSULIN (HCC): Chronic | ICD-10-CM

## 2018-03-05 RX ORDER — GLIPIZIDE 2.5 MG/1
TABLET, EXTENDED RELEASE ORAL
Qty: 16 TABLET | Refills: 1 | Status: SHIPPED | OUTPATIENT
Start: 2018-03-05 | End: 2019-01-31 | Stop reason: SDUPTHER

## 2018-04-02 RX ORDER — HYDROCHLOROTHIAZIDE 12.5 MG/1
CAPSULE, GELATIN COATED ORAL
Qty: 30 CAPSULE | Refills: 0 | Status: SHIPPED | OUTPATIENT
Start: 2018-04-02 | End: 2018-05-17 | Stop reason: SDUPTHER

## 2018-04-02 RX ORDER — METOPROLOL TARTRATE 100 MG/1
TABLET ORAL
Qty: 60 TABLET | Refills: 0 | Status: SHIPPED | OUTPATIENT
Start: 2018-04-02 | End: 2018-05-01 | Stop reason: SDUPTHER

## 2018-05-01 DIAGNOSIS — E11.9 TYPE 2 DIABETES MELLITUS WITHOUT COMPLICATION, WITHOUT LONG-TERM CURRENT USE OF INSULIN (HCC): Chronic | ICD-10-CM

## 2018-05-01 RX ORDER — GLIPIZIDE 2.5 MG/1
TABLET, EXTENDED RELEASE ORAL
Qty: 30 TABLET | Refills: 2 | Status: SHIPPED | OUTPATIENT
Start: 2018-05-01 | End: 2018-08-02 | Stop reason: SDUPTHER

## 2018-05-01 RX ORDER — HYDROCHLOROTHIAZIDE 12.5 MG/1
CAPSULE, GELATIN COATED ORAL
Qty: 30 CAPSULE | Refills: 2 | Status: SHIPPED | OUTPATIENT
Start: 2018-05-01 | End: 2018-08-02 | Stop reason: SDUPTHER

## 2018-05-01 RX ORDER — METOPROLOL TARTRATE 100 MG/1
TABLET ORAL
Qty: 60 TABLET | Refills: 2 | Status: SHIPPED | OUTPATIENT
Start: 2018-05-01 | End: 2018-08-02 | Stop reason: SDUPTHER

## 2018-05-17 ENCOUNTER — OFFICE VISIT (OUTPATIENT)
Dept: FAMILY MEDICINE CLINIC | Age: 63
End: 2018-05-17

## 2018-05-17 VITALS
HEIGHT: 61 IN | SYSTOLIC BLOOD PRESSURE: 90 MMHG | OXYGEN SATURATION: 96 % | TEMPERATURE: 97.5 F | WEIGHT: 175 LBS | DIASTOLIC BLOOD PRESSURE: 58 MMHG | BODY MASS INDEX: 33.04 KG/M2 | RESPIRATION RATE: 18 BRPM | HEART RATE: 69 BPM

## 2018-05-17 DIAGNOSIS — R05.9 COUGH: ICD-10-CM

## 2018-05-17 DIAGNOSIS — E11.9 TYPE 2 DIABETES MELLITUS WITHOUT COMPLICATION, WITHOUT LONG-TERM CURRENT USE OF INSULIN (HCC): Primary | Chronic | ICD-10-CM

## 2018-05-17 DIAGNOSIS — G89.29 CHRONIC RIGHT SHOULDER PAIN: ICD-10-CM

## 2018-05-17 DIAGNOSIS — F43.0 STRESS REACTION: ICD-10-CM

## 2018-05-17 DIAGNOSIS — I10 ESSENTIAL HYPERTENSION: Chronic | ICD-10-CM

## 2018-05-17 DIAGNOSIS — M25.511 CHRONIC RIGHT SHOULDER PAIN: ICD-10-CM

## 2018-05-17 DIAGNOSIS — E78.00 PURE HYPERCHOLESTEROLEMIA: Chronic | ICD-10-CM

## 2018-05-17 DIAGNOSIS — M62.830 MUSCLE SPASM OF BACK: ICD-10-CM

## 2018-05-17 LAB — HBA1C MFR BLD: 6.4 %

## 2018-05-17 PROCEDURE — 83036 HEMOGLOBIN GLYCOSYLATED A1C: CPT | Performed by: NURSE PRACTITIONER

## 2018-05-17 PROCEDURE — 99214 OFFICE O/P EST MOD 30 MIN: CPT | Performed by: NURSE PRACTITIONER

## 2018-05-17 RX ORDER — BENZONATATE 100 MG/1
100 CAPSULE ORAL 3 TIMES DAILY PRN
Qty: 30 CAPSULE | Refills: 0 | Status: SHIPPED | OUTPATIENT
Start: 2018-05-17 | End: 2018-05-24

## 2018-05-17 RX ORDER — SERTRALINE HYDROCHLORIDE 100 MG/1
TABLET, FILM COATED ORAL
Qty: 90 TABLET | Refills: 2 | Status: SHIPPED | OUTPATIENT
Start: 2018-05-17 | End: 2018-10-02 | Stop reason: SDUPTHER

## 2018-05-17 RX ORDER — METOPROLOL TARTRATE 50 MG/1
TABLET, FILM COATED ORAL
Qty: 60 TABLET | Refills: 2 | Status: CANCELLED | OUTPATIENT
Start: 2018-05-17

## 2018-05-17 RX ORDER — CYCLOBENZAPRINE HCL 10 MG
10 TABLET ORAL 2 TIMES DAILY PRN
Qty: 40 TABLET | Refills: 1 | Status: SHIPPED | OUTPATIENT
Start: 2018-05-17 | End: 2020-03-26 | Stop reason: SDUPTHER

## 2018-05-17 RX ORDER — TRAZODONE HYDROCHLORIDE 50 MG/1
50 TABLET ORAL NIGHTLY
Qty: 30 TABLET | Refills: 2 | Status: SHIPPED | OUTPATIENT
Start: 2018-05-17 | End: 2018-09-02 | Stop reason: SDUPTHER

## 2018-05-21 ENCOUNTER — TELEPHONE (OUTPATIENT)
Dept: FAMILY MEDICINE CLINIC | Age: 63
End: 2018-05-21

## 2018-08-02 DIAGNOSIS — E11.9 TYPE 2 DIABETES MELLITUS WITHOUT COMPLICATION, WITHOUT LONG-TERM CURRENT USE OF INSULIN (HCC): Chronic | ICD-10-CM

## 2018-08-02 RX ORDER — HYDROCHLOROTHIAZIDE 12.5 MG/1
12.5 CAPSULE, GELATIN COATED ORAL EVERY EVENING
Qty: 30 CAPSULE | Refills: 5 | Status: SHIPPED | OUTPATIENT
Start: 2018-08-02 | End: 2019-01-31 | Stop reason: SDUPTHER

## 2018-08-02 RX ORDER — GLIPIZIDE 2.5 MG/1
2.5 TABLET, EXTENDED RELEASE ORAL DAILY
Qty: 30 TABLET | Refills: 5 | Status: SHIPPED | OUTPATIENT
Start: 2018-08-02 | End: 2019-02-05 | Stop reason: SDUPTHER

## 2018-08-02 RX ORDER — METOPROLOL TARTRATE 100 MG/1
100 TABLET ORAL 2 TIMES DAILY
Qty: 60 TABLET | Refills: 5 | Status: SHIPPED | OUTPATIENT
Start: 2018-08-02 | End: 2019-01-31 | Stop reason: SDUPTHER

## 2018-09-02 RX ORDER — TRAZODONE HYDROCHLORIDE 50 MG/1
50 TABLET ORAL NIGHTLY
Qty: 30 TABLET | Refills: 1 | Status: SHIPPED | OUTPATIENT
Start: 2018-09-02 | End: 2018-10-02 | Stop reason: SDUPTHER

## 2018-09-02 NOTE — TELEPHONE ENCOUNTER
pharmacy requesting medication refill. Please approve or deny this request.    Rx requested:  Requested Prescriptions     Pending Prescriptions Disp Refills    traZODone (DESYREL) 50 MG tablet [Pharmacy Med Name: TraZODone HCl Oral Tablet 50 MG] 30 tablet 1     Sig: take 1 tablet by mouth nightly       Last Office Visit:   5/17/2018    Last Labs:  6/26/18    Next Visit Date:  No future appointments.

## 2018-10-04 RX ORDER — TRAZODONE HYDROCHLORIDE 50 MG/1
50 TABLET ORAL NIGHTLY
Qty: 30 TABLET | Refills: 0 | Status: SHIPPED | OUTPATIENT
Start: 2018-10-04 | End: 2018-11-04 | Stop reason: SDUPTHER

## 2018-10-04 RX ORDER — SERTRALINE HYDROCHLORIDE 100 MG/1
TABLET, FILM COATED ORAL
Qty: 60 TABLET | Refills: 0 | Status: SHIPPED | OUTPATIENT
Start: 2018-10-04 | End: 2019-02-05 | Stop reason: ALTCHOICE

## 2018-10-08 NOTE — TELEPHONE ENCOUNTER
Pharmacy is requesting medication refill. Please approve or deny this request.    Rx requested:  Requested Prescriptions     Pending Prescriptions Disp Refills    metFORMIN (GLUCOPHAGE) 500 MG tablet [Pharmacy Med Name: MetFORMIN HCl Oral Tablet 500 MG] 120 tablet 10     Sig: TAKE TWO TABLETS BY MOUTH TWO TIMES A DAY WITH MEALS       Last Office Visit:   5-17-18    Last Labs:  5-17-18      Next Visit Date:  No future appointments.

## 2018-10-12 ENCOUNTER — TELEPHONE (OUTPATIENT)
Dept: FAMILY MEDICINE CLINIC | Age: 63
End: 2018-10-12

## 2018-11-05 RX ORDER — TRAZODONE HYDROCHLORIDE 50 MG/1
50 TABLET ORAL NIGHTLY
Qty: 30 TABLET | Refills: 2 | Status: SHIPPED | OUTPATIENT
Start: 2018-11-05 | End: 2019-02-03 | Stop reason: SDUPTHER

## 2018-11-29 ENCOUNTER — TELEPHONE (OUTPATIENT)
Dept: FAMILY MEDICINE CLINIC | Age: 63
End: 2018-11-29

## 2019-01-31 ENCOUNTER — TELEPHONE (OUTPATIENT)
Dept: FAMILY MEDICINE CLINIC | Age: 64
End: 2019-01-31

## 2019-01-31 DIAGNOSIS — H60.543 ECZEMA OF EXTERNAL EAR, BILATERAL: ICD-10-CM

## 2019-01-31 DIAGNOSIS — E11.9 TYPE 2 DIABETES MELLITUS WITHOUT COMPLICATION, WITHOUT LONG-TERM CURRENT USE OF INSULIN (HCC): Chronic | ICD-10-CM

## 2019-01-31 RX ORDER — GLIPIZIDE 2.5 MG/1
TABLET, EXTENDED RELEASE ORAL
Qty: 90 TABLET | Refills: 0 | Status: SHIPPED | OUTPATIENT
Start: 2019-01-31 | End: 2019-05-15 | Stop reason: SDUPTHER

## 2019-01-31 RX ORDER — SERTRALINE HYDROCHLORIDE 100 MG/1
TABLET, FILM COATED ORAL
Qty: 90 TABLET | Refills: 0 | Status: SHIPPED | OUTPATIENT
Start: 2019-01-31 | End: 2019-02-05 | Stop reason: SDUPTHER

## 2019-01-31 RX ORDER — HYDROCHLOROTHIAZIDE 12.5 MG/1
12.5 CAPSULE, GELATIN COATED ORAL EVERY EVENING
Qty: 90 CAPSULE | Refills: 0 | Status: SHIPPED | OUTPATIENT
Start: 2019-01-31 | End: 2019-02-05 | Stop reason: SDUPTHER

## 2019-01-31 RX ORDER — METOPROLOL TARTRATE 100 MG/1
100 TABLET ORAL 2 TIMES DAILY
Qty: 180 TABLET | Refills: 0 | Status: SHIPPED | OUTPATIENT
Start: 2019-01-31 | End: 2019-04-20 | Stop reason: SDUPTHER

## 2019-01-31 RX ORDER — MOMETASONE FUROATE 1 MG/G
OINTMENT TOPICAL
Qty: 45 G | Refills: 0 | Status: SHIPPED | OUTPATIENT
Start: 2019-01-31

## 2019-02-04 RX ORDER — TRAZODONE HYDROCHLORIDE 50 MG/1
50 TABLET ORAL NIGHTLY
Qty: 30 TABLET | Refills: 1 | Status: SHIPPED | OUTPATIENT
Start: 2019-02-04 | End: 2020-11-09 | Stop reason: SDUPTHER

## 2019-02-05 ENCOUNTER — OFFICE VISIT (OUTPATIENT)
Dept: FAMILY MEDICINE CLINIC | Age: 64
End: 2019-02-05

## 2019-02-05 VITALS
HEIGHT: 61 IN | SYSTOLIC BLOOD PRESSURE: 110 MMHG | DIASTOLIC BLOOD PRESSURE: 62 MMHG | OXYGEN SATURATION: 96 % | HEART RATE: 65 BPM | RESPIRATION RATE: 14 BRPM | BODY MASS INDEX: 30.21 KG/M2 | TEMPERATURE: 98.2 F | WEIGHT: 160 LBS

## 2019-02-05 DIAGNOSIS — Z12.31 ENCOUNTER FOR SCREENING MAMMOGRAM FOR BREAST CANCER: ICD-10-CM

## 2019-02-05 DIAGNOSIS — E11.9 TYPE 2 DIABETES MELLITUS WITHOUT COMPLICATION, WITHOUT LONG-TERM CURRENT USE OF INSULIN (HCC): Primary | Chronic | ICD-10-CM

## 2019-02-05 DIAGNOSIS — G89.29 CHRONIC RIGHT SHOULDER PAIN: ICD-10-CM

## 2019-02-05 DIAGNOSIS — M25.511 CHRONIC RIGHT SHOULDER PAIN: ICD-10-CM

## 2019-02-05 DIAGNOSIS — F33.1 MODERATE EPISODE OF RECURRENT MAJOR DEPRESSIVE DISORDER (HCC): Chronic | ICD-10-CM

## 2019-02-05 LAB — HBA1C MFR BLD: 5.7 %

## 2019-02-05 PROCEDURE — 83036 HEMOGLOBIN GLYCOSYLATED A1C: CPT | Performed by: NURSE PRACTITIONER

## 2019-02-05 PROCEDURE — 99213 OFFICE O/P EST LOW 20 MIN: CPT | Performed by: NURSE PRACTITIONER

## 2019-02-05 RX ORDER — BUPROPION HYDROCHLORIDE 75 MG/1
75 TABLET ORAL 2 TIMES DAILY
Qty: 60 TABLET | Refills: 3 | Status: SHIPPED | OUTPATIENT
Start: 2019-02-05 | End: 2020-11-09 | Stop reason: ALTCHOICE

## 2019-02-05 RX ORDER — SERTRALINE HYDROCHLORIDE 100 MG/1
200 TABLET, FILM COATED ORAL DAILY
Qty: 60 TABLET | Refills: 5 | Status: SHIPPED | OUTPATIENT
Start: 2019-02-05 | End: 2019-10-07 | Stop reason: SDUPTHER

## 2019-02-05 ASSESSMENT — PATIENT HEALTH QUESTIONNAIRE - PHQ9
SUM OF ALL RESPONSES TO PHQ QUESTIONS 1-9: 2
2. FEELING DOWN, DEPRESSED OR HOPELESS: 1
SUM OF ALL RESPONSES TO PHQ9 QUESTIONS 1 & 2: 2
1. LITTLE INTEREST OR PLEASURE IN DOING THINGS: 1
SUM OF ALL RESPONSES TO PHQ QUESTIONS 1-9: 2

## 2019-04-22 RX ORDER — METOPROLOL TARTRATE 100 MG/1
TABLET ORAL
Qty: 180 TABLET | Refills: 0 | Status: SHIPPED | OUTPATIENT
Start: 2019-04-22 | End: 2019-07-30 | Stop reason: SDUPTHER

## 2019-04-22 NOTE — TELEPHONE ENCOUNTER
Pharmacy  requesting medication refill.  Please approve or deny this request.    Rx requested:  Requested Prescriptions     Pending Prescriptions Disp Refills    metoprolol (LOPRESSOR) 100 MG tablet [Pharmacy Med Name: Metoprolol Tartrate Oral Tablet 100 MG] 180 tablet 0     Sig: TAKE ONE TABLET BY MOUTH TWO TIMES A DAY         Last Office Visit:   2/5/2019      Next Visit Date:  Future Appointments   Date Time Provider Davin Lan   8/13/2019  9:30 AM [facility]

## 2019-05-15 DIAGNOSIS — E11.9 TYPE 2 DIABETES MELLITUS WITHOUT COMPLICATION, WITHOUT LONG-TERM CURRENT USE OF INSULIN (HCC): Chronic | ICD-10-CM

## 2019-05-16 RX ORDER — GLIPIZIDE 2.5 MG/1
TABLET, EXTENDED RELEASE ORAL
Qty: 14 TABLET | Refills: 0 | Status: SHIPPED | OUTPATIENT
Start: 2019-05-16 | End: 2019-07-19 | Stop reason: SDUPTHER

## 2019-05-31 RX ORDER — HYDROCHLOROTHIAZIDE 12.5 MG/1
12.5 CAPSULE, GELATIN COATED ORAL EVERY MORNING
Qty: 30 CAPSULE | Refills: 2 | Status: SHIPPED | OUTPATIENT
Start: 2019-05-31 | End: 2019-08-28 | Stop reason: SDUPTHER

## 2019-05-31 NOTE — TELEPHONE ENCOUNTER
Nita Reed is requesting medication refill.  Please approve or deny this request.    Rx requested:  Requested Prescriptions     Pending Prescriptions Disp Refills    hydrochlorothiazide (MICROZIDE) 12.5 MG capsule [Pharmacy Med Name: hydroCHLOROthiazide Oral Capsule 12.5 MG] 30 capsule      Sig: Take 1 capsule by mouth every morning         Last Office Visit:   2/5/2019      Next Visit Date:  Future Appointments   Date Time Provider Davin Lan   8/13/2019  9:30 AM Daiana White, [address]

## 2019-06-03 NOTE — TELEPHONE ENCOUNTER
pharmacy requesting medication refill. Please approve or deny this request.    Rx requested:  Requested Prescriptions     Pending Prescriptions Disp Refills    sertraline (ZOLOFT) 50 MG tablet [Pharmacy Med Name: Sertraline HCl Oral Tablet 50 MG] 90 tablet 0     Sig: TAKE ONE TABLET BY MOUTH DAILY.  TOTAL DAILY DOSE IS 150MG         Last Office Visit:   2/5/2019      Next Visit Date:  Future Appointments   Date Time Provider Davin Lan   8/13/2019  9:30 AM Anthony aWlsh, [address]

## 2019-07-19 DIAGNOSIS — E11.9 TYPE 2 DIABETES MELLITUS WITHOUT COMPLICATION, WITHOUT LONG-TERM CURRENT USE OF INSULIN (HCC): Chronic | ICD-10-CM

## 2019-07-19 RX ORDER — GLIPIZIDE 2.5 MG/1
TABLET, EXTENDED RELEASE ORAL
Qty: 90 TABLET | Refills: 1 | Status: SHIPPED | OUTPATIENT
Start: 2019-07-19 | End: 2019-10-07 | Stop reason: SDUPTHER

## 2019-07-30 RX ORDER — METOPROLOL TARTRATE 100 MG/1
TABLET ORAL
Qty: 180 TABLET | Refills: 0 | Status: SHIPPED | OUTPATIENT
Start: 2019-07-30 | End: 2019-10-07 | Stop reason: SDUPTHER

## 2019-08-28 ENCOUNTER — TELEPHONE (OUTPATIENT)
Dept: FAMILY MEDICINE CLINIC | Age: 64
End: 2019-08-28

## 2019-08-28 DIAGNOSIS — E11.9 TYPE 2 DIABETES MELLITUS WITHOUT COMPLICATION, WITHOUT LONG-TERM CURRENT USE OF INSULIN (HCC): Primary | Chronic | ICD-10-CM

## 2019-08-28 NOTE — TELEPHONE ENCOUNTER
Pharmacy is requesting medication refill.  Please approve or deny this request.    Rx requested:  Requested Prescriptions     Pending Prescriptions Disp Refills    hydrochlorothiazide (MICROZIDE) 12.5 MG capsule [Pharmacy Med Name: hydroCHLOROthiazide Oral Capsule 12.5 MG] 30 capsule 1     Sig: Take 1 capsule by mouth every morning         Last Office Visit:   2/5/2019      Next Visit Date:  Future Appointments   Date Time Provider Davin Lan   9/26/2019  3:00 PM Mara Alvarado, APRN - CNP [place]

## 2019-08-29 RX ORDER — HYDROCHLOROTHIAZIDE 12.5 MG/1
12.5 CAPSULE, GELATIN COATED ORAL EVERY MORNING
Qty: 30 CAPSULE | Refills: 1 | Status: SHIPPED | OUTPATIENT
Start: 2019-08-29 | End: 2019-10-07 | Stop reason: SDUPTHER

## 2019-10-07 DIAGNOSIS — F33.1 MODERATE EPISODE OF RECURRENT MAJOR DEPRESSIVE DISORDER (HCC): Chronic | ICD-10-CM

## 2019-10-07 DIAGNOSIS — E11.9 TYPE 2 DIABETES MELLITUS WITHOUT COMPLICATION, WITHOUT LONG-TERM CURRENT USE OF INSULIN (HCC): Chronic | ICD-10-CM

## 2019-10-07 RX ORDER — BENZONATATE 100 MG/1
CAPSULE ORAL
Qty: 30 CAPSULE | Refills: 0 | Status: SHIPPED | OUTPATIENT
Start: 2019-10-07 | End: 2020-11-09 | Stop reason: ALTCHOICE

## 2019-10-07 RX ORDER — METOPROLOL TARTRATE 100 MG/1
TABLET ORAL
Qty: 180 TABLET | Refills: 0 | Status: SHIPPED | OUTPATIENT
Start: 2019-10-07 | End: 2020-04-02

## 2019-10-07 RX ORDER — GLIPIZIDE 2.5 MG/1
TABLET, EXTENDED RELEASE ORAL
Qty: 90 TABLET | Refills: 1 | Status: SHIPPED | OUTPATIENT
Start: 2019-10-07 | End: 2020-04-16

## 2019-10-07 RX ORDER — SERTRALINE HYDROCHLORIDE 100 MG/1
200 TABLET, FILM COATED ORAL DAILY
Qty: 60 TABLET | Refills: 5 | Status: SHIPPED | OUTPATIENT
Start: 2019-10-07 | End: 2020-10-06 | Stop reason: SDUPTHER

## 2019-10-07 RX ORDER — HYDROCHLOROTHIAZIDE 12.5 MG/1
12.5 CAPSULE, GELATIN COATED ORAL EVERY MORNING
Qty: 30 CAPSULE | Refills: 1 | Status: SHIPPED | OUTPATIENT
Start: 2019-10-07 | End: 2020-03-02 | Stop reason: SDUPTHER

## 2019-12-02 ENCOUNTER — OFFICE VISIT (OUTPATIENT)
Dept: FAMILY MEDICINE CLINIC | Age: 64
End: 2019-12-02

## 2019-12-02 VITALS
WEIGHT: 175 LBS | DIASTOLIC BLOOD PRESSURE: 62 MMHG | BODY MASS INDEX: 33.04 KG/M2 | RESPIRATION RATE: 16 BRPM | HEIGHT: 61 IN | OXYGEN SATURATION: 95 % | HEART RATE: 68 BPM | SYSTOLIC BLOOD PRESSURE: 110 MMHG | TEMPERATURE: 97.1 F

## 2019-12-02 DIAGNOSIS — H93.8X3 EAR CONGESTION, BILATERAL: ICD-10-CM

## 2019-12-02 DIAGNOSIS — H81.12 BPPV (BENIGN PAROXYSMAL POSITIONAL VERTIGO), LEFT: ICD-10-CM

## 2019-12-02 DIAGNOSIS — H60.392 OTHER INFECTIVE ACUTE OTITIS EXTERNA OF LEFT EAR: Primary | ICD-10-CM

## 2019-12-02 PROCEDURE — 99213 OFFICE O/P EST LOW 20 MIN: CPT | Performed by: PHYSICIAN ASSISTANT

## 2019-12-02 RX ORDER — OFLOXACIN 3 MG/ML
5 SOLUTION AURICULAR (OTIC) 2 TIMES DAILY
Qty: 5 ML | Refills: 0 | Status: SHIPPED | OUTPATIENT
Start: 2019-12-02 | End: 2019-12-12

## 2019-12-02 RX ORDER — FLUTICASONE PROPIONATE 50 MCG
2 SPRAY, SUSPENSION (ML) NASAL DAILY
Qty: 1 BOTTLE | Refills: 0 | Status: SHIPPED | OUTPATIENT
Start: 2019-12-02

## 2019-12-02 RX ORDER — PREDNISONE 20 MG/1
20 TABLET ORAL DAILY
Qty: 5 TABLET | Refills: 0 | Status: SHIPPED | OUTPATIENT
Start: 2019-12-02 | End: 2020-05-11 | Stop reason: SDUPTHER

## 2019-12-02 ASSESSMENT — ENCOUNTER SYMPTOMS
CONSTIPATION: 0
ABDOMINAL PAIN: 0
SINUS PRESSURE: 0
SINUS PAIN: 0
DIARRHEA: 0
VOMITING: 0
NAUSEA: 1
COUGH: 0

## 2020-03-26 ENCOUNTER — VIRTUAL VISIT (OUTPATIENT)
Dept: FAMILY MEDICINE CLINIC | Age: 65
End: 2020-03-26

## 2020-03-26 PROCEDURE — 99214 OFFICE O/P EST MOD 30 MIN: CPT | Performed by: NURSE PRACTITIONER

## 2020-03-26 RX ORDER — HYDROCODONE BITARTRATE AND ACETAMINOPHEN 5; 325 MG/1; MG/1
1 TABLET ORAL EVERY 6 HOURS PRN
Qty: 28 TABLET | Refills: 0 | Status: SHIPPED | OUTPATIENT
Start: 2020-03-26 | End: 2020-04-02

## 2020-03-26 RX ORDER — CYCLOBENZAPRINE HCL 10 MG
10 TABLET ORAL 2 TIMES DAILY PRN
Qty: 40 TABLET | Refills: 1 | Status: SHIPPED | OUTPATIENT
Start: 2020-03-26 | End: 2021-02-15 | Stop reason: SDUPTHER

## 2020-03-26 NOTE — PATIENT INSTRUCTIONS

## 2020-03-26 NOTE — PROGRESS NOTES
3/26/2020    TELEHEALTH EVALUATION -- Audio/Visual (During IMZFQ-40 public health emergency)    Due to [place] 19 outbreak, patient's office visit was converted to a virtual visit. Patient was contacted and agreed to proceed with a virtual visit via [email or web address]. me  The risks and benefits of converting to a virtual visit were discussed in light of the current infectious disease epidemic. Patient also understood that insurance coverage and co-pays are up to their individual insurance plans. HPI:    Halle Duran (:  1955) has requested an audio/video evaluation for the following concern(s): low back pain. Low back pain: She states that she has a history of issues with her lower back but over the past 2 months she has had more significant symptoms including pain that is rated as severe at times on both sides of her lower back that radiates down both of her legs and makes her toes feel jimenez puffy. No swelling reported. She has been taking some Flexeril that she had leftover but does not have a current prescription. Has been doing some low back stretching exercises without much relief. No pain with urination or difficulty urinating. No fever or chills. Left knee pain: She also has an ongoing history of left knee pain and states that her left knee has been bothering her more lately. Last x-ray was in 2016. Occasionally feels like the knee gives out on her but this does not happen all the time. Depression/anxiety: Mills Stacks reports being in a good mood that is stable. The patient is not reporting insomnia, difficulty concentrating and usual interest in activities. This patient is not homicidal or suicidal.    Diabetes: She states that she has been following a healthy diet lately and trying to get some routine activity moving around. She has not had any low sugar episodes. HTN: She states that she has not taken her blood pressure at home lately but she can do so if I would like.   She has been 0.1 % ointment Apply topically daily. Margaretann Smoke DRE Salvador CNP   triamcinolone (KENALOG) 0.1 % cream Apply topically 2 times daily. Margaretann Smoke RivarunyDRE - CNP   Lactobacillus (PROBIOTIC ACIDOPHILUS) TABS Take 1 tablet by mouth 2 times daily  Margaretann Smoke DRE Salvador CNP   nystatin (MYCOSTATIN) 483694 UNIT/GM cream Apply topically 2 times daily. BAMBI Castellanos   hydrochlorothiazide (MICROZIDE) 12.5 MG capsule Take 1 capsule by mouth every morning  Kaylen Laurent MD   ipratropium (ATROVENT) 0.06 % nasal spray 2 sprays by Nasal route 3 times daily  Patient not taking: Reported on 12/2/2019  Kaylen Laurent MD   albuterol sulfate HFA (PROVENTIL HFA) 108 (90 BASE) MCG/ACT inhaler Inhale 2 puffs into the lungs every 6 hours as needed for Wheezing  Margaretann Smoke DRE Salvador CNP   mometasone (ASMANEX 30 METERED DOSES) 220 MCG/INH inhaler Inhale 1 puff into the lungs daily  DRE Bennett CNP   Cholecalciferol (VITAMIN D3) 5000 UNITS TABS Take 5,000 Units by mouth daily  Historical Provider, MD   ibuprofen (ADVIL;MOTRIN) 800 MG tablet Take 1 tablet by mouth every 6 hours as needed for Pain  Patient not taking: Reported on 12/2/2019  Margaretann Smoke Riedy, APRN - CNP   FISH OIL 1,000 mg by Does not apply route daily.   Historical Provider, MD       Social History     Tobacco Use    Smoking status: Former Smoker     Packs/day: 2.00     Years: 30.00     Pack years: 60.00     Last attempt to quit: 1/1/2010     Years since quitting: 10.2    Smokeless tobacco: Never Used   Substance Use Topics    Alcohol use: No    Drug use: No        Health Maintenance   Topic Date Due    HIV screen  12/15/1972    Shingles Vaccine (1 of 2) 12/15/2007    Colon cancer screen colonoscopy  12/15/2007    Diabetic retinal exam  02/10/2015    Breast cancer screen  03/31/2017    Cervical cancer screen  09/18/2017    Diabetic microalbuminuria test  06/26/2018    Lipid screen  06/26/2018    Potassium monitoring  06/26/2018

## 2020-04-02 RX ORDER — METOPROLOL TARTRATE 100 MG/1
TABLET ORAL
Qty: 180 TABLET | Refills: 0 | Status: SHIPPED | OUTPATIENT
Start: 2020-04-02 | End: 2020-07-06

## 2020-04-02 NOTE — TELEPHONE ENCOUNTER
pharmacy requesting medication refill. Please approve or deny this request.    Rx requested:  Requested Prescriptions     Pending Prescriptions Disp Refills    metoprolol (LOPRESSOR) 100 MG tablet [Pharmacy Med Name: Metoprolol Tartrate Oral Tablet 100 MG] 180 tablet 0     Sig: TAKE ONE TABLET BY MOUTH TWO TIMES A DAY         Last Office Visit:   2/5/2019      Next Visit Date:  No future appointments.

## 2020-04-16 RX ORDER — GLIPIZIDE 2.5 MG/1
TABLET, EXTENDED RELEASE ORAL
Qty: 90 TABLET | Refills: 1 | Status: SHIPPED | OUTPATIENT
Start: 2020-04-16 | End: 2020-10-05 | Stop reason: SDUPTHER

## 2020-04-16 RX ORDER — HYDROCHLOROTHIAZIDE 12.5 MG/1
12.5 CAPSULE, GELATIN COATED ORAL EVERY MORNING
Qty: 90 CAPSULE | Refills: 1 | Status: SHIPPED | OUTPATIENT
Start: 2020-04-16 | End: 2020-11-05

## 2020-05-11 ENCOUNTER — VIRTUAL VISIT (OUTPATIENT)
Dept: FAMILY MEDICINE CLINIC | Age: 65
End: 2020-05-11

## 2020-05-11 PROCEDURE — 99213 OFFICE O/P EST LOW 20 MIN: CPT | Performed by: NURSE PRACTITIONER

## 2020-05-11 RX ORDER — DOXYCYCLINE HYCLATE 100 MG
100 TABLET ORAL 2 TIMES DAILY
Qty: 20 TABLET | Refills: 0 | Status: SHIPPED | OUTPATIENT
Start: 2020-05-11 | End: 2020-11-09 | Stop reason: ALTCHOICE

## 2020-05-11 RX ORDER — PREDNISONE 20 MG/1
20 TABLET ORAL DAILY
Qty: 5 TABLET | Refills: 0 | Status: SHIPPED | OUTPATIENT
Start: 2020-05-11 | End: 2020-05-16

## 2020-05-11 RX ORDER — MECLIZINE HYDROCHLORIDE 25 MG/1
25 TABLET ORAL 3 TIMES DAILY PRN
Qty: 30 TABLET | Refills: 0 | Status: SHIPPED | OUTPATIENT
Start: 2020-05-11 | End: 2020-05-21

## 2020-05-11 NOTE — PROGRESS NOTES
following concern(s): Sinus pressure/infection. She states that symptoms started about 10 days ago with pressure around her right eye. Also has some pain in her left ear. She has tried some over-the-counter treatment with no improvement in symptoms. Has had some thick yellow/green drainage and postnasal drip. Getting somewhat sick to her stomach as well. She denies any fever or chills. No cough or chest congestion. No body aches. She has developed some mild dizziness over the past day or so. She has had this symptom in the past when she has had sinus infections. Prior to Visit Medications    Medication Sig Taking? Authorizing Provider   predniSONE (DELTASONE) 20 MG tablet Take 1 tablet by mouth daily for 5 days Yes Jearlean Bernheim Riedy, APRN - CNP   doxycycline hyclate (VIBRA-TABS) 100 MG tablet Take 1 tablet by mouth 2 times daily Yes Jearlean Bernheim Riedy, APRN - CNP   meclizine (ANTIVERT) 25 MG tablet Take 1 tablet by mouth 3 times daily as needed for Dizziness Yes Jearlean Bernheim Riedy, APRN - CNP   hydrochlorothiazide (MICROZIDE) 12.5 MG capsule Take 1 capsule by mouth every morning  DRE Dawson CNP   glipiZIDE (GLUCOTROL XL) 2.5 MG extended release tablet TAKE ONE TABLET BY MOUTH EVERY DAY  DRE Dawson CNP   metoprolol (LOPRESSOR) 100 MG tablet TAKE ONE TABLET BY MOUTH TWO TIMES A DAY  DRE Dawson CNP   cyclobenzaprine (FLEXERIL) 10 MG tablet Take 1 tablet by mouth 2 times daily as needed for Muscle spasms  Jearlean Bernheim Riedy, APRN - CNP   fluticasone (FLONASE) 50 MCG/ACT nasal spray 2 sprays by Nasal route daily  BAMBI Pickens   sertraline (ZOLOFT) 50 MG tablet TAKE ONE TABLET BY MOUTH DAILY.  TOTAL DAILY DOSE IS 150MG  Jearlean Bernheim Riedy, APRN - CNP   sertraline (ZOLOFT) 100 MG tablet Take 2 tablets by mouth daily  Jearlean Bernheim Riedy, APRN - CNP   benzonatate (TESSALON) 100 MG capsule TAKE ONE CAPSULE BY MOUTH THREE TIMES A DAY AS NEEDED FOR COUGH  Jearlean Bernheim Riedy, APRN - CNP Oriented to person/place/time    [x] No apparent distress  [] Toxic appearing    [] Face flushed appearing [] Sclera clear  [] Lips are cyanotic      [x] Breathing appears normal  [] Appears tachypneic      [] Rash on visible skin    [x] Cranial Nerves II-XII grossly intact    [x] Motor grossly intact in visible upper extremities    [] Motor grossly intact in visible lower extremities    [x] Normal Mood  [] Anxious appearing    [] Depressed appearing  [] Confused appearing      [] Poor short term memory  [] Poor long term memory    [] OTHER:      Due to this being a TeleHealth encounter, evaluation of the following organ systems is limited: Vitals/Constitutional/EENT/Resp/CV/GI//MS/Neuro/Skin/Heme-Lymph-Imm. Diagnosis Orders   1. Acute recurrent frontal sinusitis  doxycycline hyclate (VIBRA-TABS) 100 MG tablet   2. Dizziness  meclizine (ANTIVERT) 25 MG tablet   3. Right ear pain     4. Ear congestion, bilateral  predniSONE (DELTASONE) 20 MG tablet         Return if symptoms worsen or fail to improve. Antibiotic ordered to be taken for the next 10 days with food. Prednisone ordered to be taken if symptoms do not start to improve with antibiotic. Meclizine ordered for intermittent dizziness associated with sinusitis. The patient is instructed to take Probiotic tablets twice a day for the duration of antibiotic therapy and for 4 days after completion of antibiotics. This will help restore the good bacteria to your colon and prevent side effects of antibiotic therapy such as cramping and diarrhea. Probiotic tablets can be found at your local pharmacy over the counter. Ask your pharmacist if you need help finding tablets. Please note this report has been partially produced using speech recognition software and may cause contain errors related to that system including grammar, punctuation and spelling as well as words and phrases that may seem inappropriate.  If there are questions or concerns

## 2020-07-06 RX ORDER — METOPROLOL TARTRATE 100 MG/1
TABLET ORAL
Qty: 180 TABLET | Refills: 0 | Status: SHIPPED | OUTPATIENT
Start: 2020-07-06 | End: 2021-01-04

## 2020-10-05 RX ORDER — GLIPIZIDE 2.5 MG/1
TABLET, EXTENDED RELEASE ORAL
Qty: 90 TABLET | Refills: 1 | Status: SHIPPED | OUTPATIENT
Start: 2020-10-05 | End: 2020-11-09 | Stop reason: SDUPTHER

## 2020-10-05 NOTE — TELEPHONE ENCOUNTER
Patient is requesting medication refill. Please approve or deny this request.    Rx requested:  Requested Prescriptions     Pending Prescriptions Disp Refills    glipiZIDE (GLUCOTROL XL) 2.5 MG extended release tablet 90 tablet 1     Sig: TAKE ONE TABLET BY MOUTH EVERY DAY         Last Office Visit:   5/11/2020      Next Visit Date:  No future appointments. Patient took her last one today. dario

## 2020-10-05 NOTE — TELEPHONE ENCOUNTER
pharmacy requesting medication refill. Please approve or deny this request.    Rx requested:  Requested Prescriptions     Pending Prescriptions Disp Refills    sertraline (ZOLOFT) 100 MG tablet 60 tablet 5     Sig: Take 2 tablets by mouth daily         Last Office Visit:   5/11/2020      Next Visit Date:  No future appointments.

## 2020-10-06 RX ORDER — SERTRALINE HYDROCHLORIDE 100 MG/1
200 TABLET, FILM COATED ORAL DAILY
Qty: 60 TABLET | Refills: 5 | Status: SHIPPED | OUTPATIENT
Start: 2020-10-06 | End: 2020-11-09 | Stop reason: ALTCHOICE

## 2020-10-13 ENCOUNTER — TELEPHONE (OUTPATIENT)
Dept: FAMILY MEDICINE CLINIC | Age: 65
End: 2020-10-13

## 2020-11-05 RX ORDER — HYDROCHLOROTHIAZIDE 12.5 MG/1
12.5 CAPSULE, GELATIN COATED ORAL EVERY MORNING
Qty: 90 CAPSULE | Refills: 0 | Status: SHIPPED | OUTPATIENT
Start: 2020-11-05 | End: 2020-11-09 | Stop reason: SDUPTHER

## 2020-11-06 ENCOUNTER — HOSPITAL ENCOUNTER (OUTPATIENT)
Dept: LAB | Age: 65
Discharge: HOME | End: 2020-11-06

## 2020-11-06 LAB
ALBUMIN SERPL-MCNC: 4.6 G/DL (ref 3.5–4.6)
ALP BLD-CCNC: 87 U/L (ref 40–130)
ALT SERPL-CCNC: 53 U/L (ref 0–33)
ANION GAP SERPL CALCULATED.3IONS-SCNC: 18 MEQ/L (ref 9–15)
AST SERPL-CCNC: 38 U/L (ref 0–35)
BASOPHILS ABSOLUTE: 0 K/UL (ref 0–0.2)
BASOPHILS RELATIVE PERCENT: 0.6 %
BILIRUB SERPL-MCNC: 0.3 MG/DL (ref 0.2–0.7)
BUN BLDV-MCNC: 24 MG/DL (ref 8–23)
CALCIUM SERPL-MCNC: 10.5 MG/DL (ref 8.5–9.9)
CHLORIDE BLD-SCNC: 93 MEQ/L (ref 95–107)
CHOLESTEROL, TOTAL: 252 MG/DL (ref 0–199)
CO2: 26 MEQ/L (ref 20–31)
CREAT SERPL-MCNC: 0.88 MG/DL (ref 0.5–0.9)
CREATININE URINE: 184.1 MG/DL
EOSINOPHILS ABSOLUTE: 0.2 K/UL (ref 0–0.7)
EOSINOPHILS RELATIVE PERCENT: 2.7 %
GFR AFRICAN AMERICAN: >60
GFR NON-AFRICAN AMERICAN: >60
GLOBULIN: 3.2 G/DL (ref 2.3–3.5)
GLUCOSE BLD-MCNC: 264 MG/DL (ref 70–99)
HBA1C MFR BLD: 10.4 % (ref 4.8–5.9)
HCT VFR BLD CALC: 44.9 % (ref 37–47)
HDLC SERPL-MCNC: 47 MG/DL (ref 40–59)
HEMOGLOBIN: 14.7 G/DL (ref 12–16)
LDL CHOLESTEROL CALCULATED: 156 MG/DL (ref 0–129)
LYMPHOCYTES ABSOLUTE: 1.1 K/UL (ref 1–4.8)
LYMPHOCYTES RELATIVE PERCENT: 17.5 %
MCH RBC QN AUTO: 28.9 PG (ref 27–31.3)
MCHC RBC AUTO-ENTMCNC: 32.7 % (ref 33–37)
MCV RBC AUTO: 88.6 FL (ref 82–100)
MICROALBUMIN UR-MCNC: 1.8 MG/DL
MICROALBUMIN/CREAT UR-RTO: 9.8 MG/G (ref 0–30)
MONOCYTES ABSOLUTE: 0.4 K/UL (ref 0.2–0.8)
MONOCYTES RELATIVE PERCENT: 7.3 %
NEUTROPHILS ABSOLUTE: 4.3 K/UL (ref 1.4–6.5)
NEUTROPHILS RELATIVE PERCENT: 71.9 %
PDW BLD-RTO: 15.5 % (ref 11.5–14.5)
PLATELET # BLD: 197 K/UL (ref 130–400)
POTASSIUM SERPL-SCNC: 5.3 MEQ/L (ref 3.4–4.9)
RBC # BLD: 5.07 M/UL (ref 4.2–5.4)
SODIUM BLD-SCNC: 137 MEQ/L (ref 135–144)
TOTAL PROTEIN: 7.8 G/DL (ref 6.3–8)
TRIGL SERPL-MCNC: 245 MG/DL (ref 0–150)
WBC # BLD: 6 K/UL (ref 4.8–10.8)

## 2020-11-06 PROCEDURE — 80053 COMPREHEN METABOLIC PANEL: CPT

## 2020-11-06 PROCEDURE — 82570 ASSAY OF URINE CREATININE: CPT

## 2020-11-06 PROCEDURE — 36415 COLL VENOUS BLD VENIPUNCTURE: CPT

## 2020-11-06 PROCEDURE — 83036 HEMOGLOBIN GLYCOSYLATED A1C: CPT

## 2020-11-06 PROCEDURE — 82043 UR ALBUMIN QUANTITATIVE: CPT

## 2020-11-06 PROCEDURE — 80061 LIPID PANEL: CPT

## 2020-11-06 PROCEDURE — 85025 COMPLETE CBC W/AUTO DIFF WBC: CPT

## 2020-11-09 ENCOUNTER — VIRTUAL VISIT (OUTPATIENT)
Dept: FAMILY MEDICINE CLINIC | Age: 65
End: 2020-11-09

## 2020-11-09 PROCEDURE — 99214 OFFICE O/P EST MOD 30 MIN: CPT | Performed by: NURSE PRACTITIONER

## 2020-11-09 RX ORDER — MONTELUKAST SODIUM 10 MG/1
10 TABLET ORAL DAILY
Qty: 30 TABLET | Refills: 3 | Status: SHIPPED | OUTPATIENT
Start: 2020-11-09 | End: 2021-02-15 | Stop reason: SDUPTHER

## 2020-11-09 RX ORDER — GLIPIZIDE 10 MG/1
TABLET, FILM COATED, EXTENDED RELEASE ORAL
Qty: 30 TABLET | Refills: 2 | Status: SHIPPED | OUTPATIENT
Start: 2020-11-09 | End: 2021-02-15 | Stop reason: SDUPTHER

## 2020-11-09 RX ORDER — ALBUTEROL SULFATE 90 UG/1
2 AEROSOL, METERED RESPIRATORY (INHALATION) 4 TIMES DAILY PRN
Qty: 1 INHALER | Refills: 0 | Status: SHIPPED | OUTPATIENT
Start: 2020-11-09 | End: 2021-02-15 | Stop reason: SDUPTHER

## 2020-11-09 RX ORDER — SERTRALINE HYDROCHLORIDE 100 MG/1
100 TABLET, FILM COATED ORAL DAILY
Qty: 60 TABLET | Refills: 2 | Status: SHIPPED | OUTPATIENT
Start: 2020-11-09 | End: 2021-02-15 | Stop reason: SDUPTHER

## 2020-11-09 RX ORDER — SERTRALINE HYDROCHLORIDE 100 MG/1
100 TABLET, FILM COATED ORAL DAILY
COMMUNITY
End: 2020-11-09 | Stop reason: SDUPTHER

## 2020-11-09 RX ORDER — ALBUTEROL SULFATE 90 UG/1
2 AEROSOL, METERED RESPIRATORY (INHALATION) EVERY 6 HOURS PRN
Qty: 1 INHALER | Refills: 3 | Status: SHIPPED | OUTPATIENT
Start: 2020-11-09 | End: 2021-02-15 | Stop reason: ALTCHOICE

## 2020-11-09 RX ORDER — TRAZODONE HYDROCHLORIDE 100 MG/1
100 TABLET ORAL NIGHTLY
Qty: 30 TABLET | Refills: 2 | Status: SHIPPED | OUTPATIENT
Start: 2020-11-09

## 2020-11-09 ASSESSMENT — PATIENT HEALTH QUESTIONNAIRE - PHQ9
1. LITTLE INTEREST OR PLEASURE IN DOING THINGS: 1
SUM OF ALL RESPONSES TO PHQ9 QUESTIONS 1 & 2: 2
SUM OF ALL RESPONSES TO PHQ QUESTIONS 1-9: 2
2. FEELING DOWN, DEPRESSED OR HOPELESS: 1

## 2020-11-09 NOTE — PROGRESS NOTES
2020    TELEHEALTH EVALUATION -- Audio/Visual (During -49 public health emergency)    Due to COVID 19 outbreak, patient's office visit was converted to a virtual visit. Patient was contacted and agreed to proceed with a virtual visit via Doxy. me  The risks and benefits of converting to a virtual visit were discussed in light of the current infectious disease epidemic. Patient also understood that insurance coverage and co-pays are up to their individual insurance plans. Jenifer Gregory is a 60 y.o. female being evaluated by a Virtual Visit (video visit) encounter to address concerns as mentioned above. A caregiver was present when appropriate. Due to this being a TeleHealth encounter (During Z-59 public health emergency), evaluation of the following organ systems was limited: Vitals/Constitutional/EENT/Resp/CV/GI//MS/Neuro/Skin/Heme-Lymph-Imm. Pursuant to the emergency declaration under the [address] [place] [address] and the [facility] and [employer] Act, this Virtual Visit was conducted with patient's (and/or legal guardian's) consent, to reduce the patient's risk of exposure to COVID-19 and provide necessary medical care. The patient (and/or legal guardian) has also been advised to contact this office for worsening conditions or problems, and seek emergency medical treatment and/or call 911 if deemed necessary. Patient identification was verified at the start of the visit: Yes    Total time spent for this encounter: not billed by time. Services were provided through a video synchronous discussion virtually to substitute for in-person clinic visit. Patient and provider were located at their individual homes. The patient is talking with me virtually from her home and I am located at my office in [facility].        HPI:    Jenifer Gregory (:  1955) has requested an audio/video evaluation for the following concern(s):    Diabetes: She states that she has not really been checking her sugar lately but is not aware of any low sugar episodes. She has been taking the glipizide 2.5 mg without issue. She has not had any significant change in her diet but admits that she has not been getting much of any physical activity secondary to the pandemic. HTN/dyslipidemia: She states that her blood pressure has been running fine at home. Continues to consume a low-salt diet. Has not been getting much physical activity. Asthma: She states that her allergies have been bad lately. She has been having some issues with chest tightness and wheezing at times. Is taking over-the-counter antihistamine but feels that her symptoms are not well controlled. She does have Flonase nasal spray but this has not really been helping. She has used a friend's albuterol inhaler which was helpful but she does not have her own. Depression/insomnia: Javan Lopez reports being in a down mood that is stable. The patient is reporting insomnia and difficulty concentrating. There is still less than usual interest in activities. This patient is not homicidal or suicidal.  She has been taking medication as ordered but feels that it has not been as beneficial as it was in the past.      Review of Systems   This patient reports no chest pain or pressure. There is no shortness of breath or cough. The patient reports no nausea or vomiting. There is no heartburn or indigestion. There is no diarrhea or constipation. No black, bloody, mucusy or tarry stool noticed. The patient reports no bloating and no change in appetite. There is no numbness, tingling or swelling in the extremities. Prior to Visit Medications    Medication Sig Taking?  Authorizing Provider   albuterol sulfate HFA (PROVENTIL HFA) 108 (90 Base) MCG/ACT inhaler Inhale 2 puffs into the lungs every 6 hours as needed for Wheezing Yes Sonia De Anda, APRN - CNP Nate Adan, APRN - CNP       Social History     Tobacco Use    Smoking status: Former Smoker     Packs/day: 2.00     Years: 30.00     Pack years: 60.00     Last attempt to quit: 1/1/2010     Years since quitting: 10.8    Smokeless tobacco: Never Used   Substance Use Topics    Alcohol use: No    Drug use: No            PHYSICAL EXAMINATION:  [ INSTRUCTIONS:  \"[x]\" Indicates a positive item  \"[]\" Indicates a negative item  -- DELETE ALL ITEMS NOT EXAMINED]  [x] Alert  [x] Oriented to person/place/time    [x] No apparent distress  [] Toxic appearing    [] Face flushed appearing [] Sclera clear  [] Lips are cyanotic      [x] Breathing appears normal  [] Appears tachypneic      [] Rash on visible skin    [x] Cranial Nerves II-XII grossly intact    [x] Motor grossly intact in visible upper extremities    [] Motor grossly intact in visible lower extremities    [x] Normal Mood  [] Anxious appearing    [] Depressed appearing  [] Confused appearing      [] Poor short term memory  [] Poor long term memory    [] OTHER:      Due to this being a TeleHealth encounter, evaluation of the following organ systems is limited: Vitals/Constitutional/EENT/Resp/CV/GI//MS/Neuro/Skin/Heme-Lymph-Imm. ASSESSMENT/PLAN:   Diagnosis Orders   1. Type 2 diabetes mellitus without complication, without long-term current use of insulin (HCC)  glipiZIDE (GLUCOTROL XL) 10 MG extended release tablet    metFORMIN (GLUCOPHAGE) 500 MG tablet    Comprehensive Metabolic Panel    Hemoglobin A1C   2. Essential hypertension     3. Pure hypercholesterolemia     4. Fatty infiltration of liver     5. Moderate episode of recurrent major depressive disorder (HCC)  sertraline (ZOLOFT) 100 MG tablet   6. Insomnia, unspecified type  traZODone (DESYREL) 100 MG tablet   7. Moderate persistent asthma with acute exacerbation  montelukast (SINGULAIR) 10 MG tablet    albuterol sulfate HFA (VENTOLIN HFA) 108 (90 Base) MCG/ACT inhaler   8.  Hyperkalemia  Comprehensive Metabolic Panel    Hemoglobin A1C   9. Encounter for screening mammogram for breast cancer  NANCIE DIGITAL SCREEN W OR WO CAD BILATERAL       Return in about 3 months (around 2/9/2021) for diabetes- doxy or in office- level 2.     1.  Diabetes is not well controlled with hemoglobin A1c greater than 10. Recommend increasing dose of glipizide and restarting Metformin. She had been taking higher dose Metformin with slightly decreased kidney function. We will recheck kidney function in 3 months. Discussed need for change in diet and lifestyle. She verbalizes understanding. 2.  3.  She states that her blood pressure has been well controlled. I reviewed lab results with her to show elevated LDL at 156. She admits to eating poorly over recent weeks coming up to blood work. She plans to make significant changes. Prefers to stay off of additional medication. Recheck lab in 3 months. 4.  Discussed elevated liver enzymes and she does have a history of fatty infiltration of the liver. She currently does not have insurance but we should repeat blood work in 3 months and consider liver ultrasound in the future. 5.6. Mood has not been stable and she has not been sleeping well at night. Recommend increasing trazodone to 100 mg nightly and Zoloft to 200 mg daily. Notify me if mood becomes unstable prior to her next visit. 7.  Recommend starting Singulair for allergy induced asthma symptoms. Albuterol prescription sent to local pharmacy and she is aware of good Rx coupon option. Notify me if breathing difficulty occurs or go to ER if become significant. 8.  Discussed elevated potassium level. She will cut back on eating potatoes which she has been eating a lot more lately. Can recheck this lab sooner but she states that she will notify me if any symptoms occur. 9.  Discussed recommendation for breast cancer screening and order for mammogram given.         Please note this report has been partially produced using speech recognition software and may cause contain errors related to that system including grammar, punctuation and spelling as well as words and phrases that may seem inappropriate. If there are questions or concerns please feel free to contact me to clarify. An  electronic signature was used to authenticate this note. --Geenvieve Jorgensen, DRE - CNP on 11/9/2020 at 3:34 PM        Pursuant to the emergency declaration under the [facility], [facility] waiver authority and the [employer] and [employer] Act, this Virtual  Visit was conducted, with patient's consent, to reduce the patient's risk of exposure to COVID-19 and provide continuity of care for an established patient. Services were provided through a video synchronous discussion virtually to substitute for in-person clinic visit.

## 2021-01-02 NOTE — TELEPHONE ENCOUNTER
Pharmacy  requesting medication refill.  Please approve or deny this request.    Rx requested:  Requested Prescriptions     Pending Prescriptions Disp Refills    metoprolol (LOPRESSOR) 100 MG tablet [Pharmacy Med Name: Metoprolol Tartrate Oral Tablet 100 MG] 180 tablet 0     Sig: TAKE ONE TABLET BY MOUTH TWO TIMES A DAY         Last Office Visit:   11/9/2020      Next Visit Date:  Future Appointments   Date Time Provider Davin Lan   2/15/2021  2:20 PM Benny Phillips, APRN - CNP Britt[facility] Stephens Kin

## 2021-01-04 RX ORDER — METOPROLOL TARTRATE 100 MG/1
TABLET ORAL
Qty: 180 TABLET | Refills: 0 | Status: SHIPPED | OUTPATIENT
Start: 2021-01-04 | End: 2021-02-15 | Stop reason: SDUPTHER

## 2021-02-15 ENCOUNTER — VIRTUAL VISIT (OUTPATIENT)
Dept: FAMILY MEDICINE CLINIC | Age: 66
End: 2021-02-15

## 2021-02-15 DIAGNOSIS — E11.9 TYPE 2 DIABETES MELLITUS WITHOUT COMPLICATION, WITHOUT LONG-TERM CURRENT USE OF INSULIN (HCC): Primary | ICD-10-CM

## 2021-02-15 DIAGNOSIS — M25.512 ACUTE PAIN OF LEFT SHOULDER: ICD-10-CM

## 2021-02-15 DIAGNOSIS — Z91.81 HISTORY OF RECENT FALL: ICD-10-CM

## 2021-02-15 DIAGNOSIS — G47.00 INSOMNIA, UNSPECIFIED TYPE: ICD-10-CM

## 2021-02-15 DIAGNOSIS — G89.29 CHRONIC BILATERAL LOW BACK PAIN WITH BILATERAL SCIATICA: ICD-10-CM

## 2021-02-15 DIAGNOSIS — F33.1 MODERATE EPISODE OF RECURRENT MAJOR DEPRESSIVE DISORDER (HCC): ICD-10-CM

## 2021-02-15 DIAGNOSIS — Z12.11 SCREENING FOR COLON CANCER: ICD-10-CM

## 2021-02-15 DIAGNOSIS — M25.562 ACUTE PAIN OF LEFT KNEE: ICD-10-CM

## 2021-02-15 DIAGNOSIS — M54.42 CHRONIC BILATERAL LOW BACK PAIN WITH BILATERAL SCIATICA: ICD-10-CM

## 2021-02-15 DIAGNOSIS — I10 ESSENTIAL HYPERTENSION: Chronic | ICD-10-CM

## 2021-02-15 DIAGNOSIS — J45.41 MODERATE PERSISTENT ASTHMA WITH ACUTE EXACERBATION: ICD-10-CM

## 2021-02-15 DIAGNOSIS — M54.41 CHRONIC BILATERAL LOW BACK PAIN WITH BILATERAL SCIATICA: ICD-10-CM

## 2021-02-15 DIAGNOSIS — M62.830 MUSCLE SPASM OF BACK: ICD-10-CM

## 2021-02-15 DIAGNOSIS — E78.5 DYSLIPIDEMIA: ICD-10-CM

## 2021-02-15 PROCEDURE — 99214 OFFICE O/P EST MOD 30 MIN: CPT | Performed by: NURSE PRACTITIONER

## 2021-02-15 RX ORDER — METOPROLOL TARTRATE 100 MG/1
TABLET ORAL
Qty: 180 TABLET | Refills: 1 | Status: SHIPPED | OUTPATIENT
Start: 2021-02-15 | End: 2021-06-11 | Stop reason: SDUPTHER

## 2021-02-15 RX ORDER — SERTRALINE HYDROCHLORIDE 100 MG/1
100 TABLET, FILM COATED ORAL DAILY
Qty: 180 TABLET | Refills: 1 | Status: SHIPPED | OUTPATIENT
Start: 2021-02-15 | End: 2021-06-11 | Stop reason: SDUPTHER

## 2021-02-15 RX ORDER — GLIPIZIDE 10 MG/1
TABLET, FILM COATED, EXTENDED RELEASE ORAL
Qty: 90 TABLET | Refills: 1 | Status: SHIPPED | OUTPATIENT
Start: 2021-02-15 | End: 2021-06-11 | Stop reason: SDUPTHER

## 2021-02-15 RX ORDER — ALBUTEROL SULFATE 90 UG/1
2 AEROSOL, METERED RESPIRATORY (INHALATION) 4 TIMES DAILY PRN
Qty: 1 INHALER | Refills: 2 | Status: SHIPPED | OUTPATIENT
Start: 2021-02-15 | End: 2021-06-11 | Stop reason: SDUPTHER

## 2021-02-15 RX ORDER — HYDROCODONE BITARTRATE AND ACETAMINOPHEN 5; 325 MG/1; MG/1
1 TABLET ORAL EVERY 6 HOURS PRN
Qty: 28 TABLET | Refills: 0 | Status: SHIPPED | OUTPATIENT
Start: 2021-02-15 | End: 2021-02-22

## 2021-02-15 RX ORDER — HYDROCHLOROTHIAZIDE 12.5 MG/1
12.5 CAPSULE, GELATIN COATED ORAL EVERY MORNING
Qty: 90 CAPSULE | Refills: 1 | Status: SHIPPED | OUTPATIENT
Start: 2021-02-15 | End: 2021-06-11 | Stop reason: SDUPTHER

## 2021-02-15 RX ORDER — MONTELUKAST SODIUM 10 MG/1
10 TABLET ORAL DAILY
Qty: 90 TABLET | Refills: 1 | Status: SHIPPED | OUTPATIENT
Start: 2021-02-15 | End: 2021-06-11 | Stop reason: SDUPTHER

## 2021-02-15 RX ORDER — CYCLOBENZAPRINE HCL 10 MG
10 TABLET ORAL 2 TIMES DAILY PRN
Qty: 40 TABLET | Refills: 2 | Status: SHIPPED | OUTPATIENT
Start: 2021-02-15 | End: 2021-06-11 | Stop reason: SDUPTHER

## 2021-02-15 ASSESSMENT — PATIENT HEALTH QUESTIONNAIRE - PHQ9
SUM OF ALL RESPONSES TO PHQ QUESTIONS 1-9: 2
SUM OF ALL RESPONSES TO PHQ QUESTIONS 1-9: 2
2. FEELING DOWN, DEPRESSED OR HOPELESS: 1

## 2021-02-15 NOTE — PROGRESS NOTES
2/15/2021    TELEHEALTH EVALUATION -- Audio/Visual (During WEN-94 public health emergency)    Due to [employer] 19 outbreak, patient's office visit was converted to a virtual visit. Patient was contacted and agreed to proceed with a virtual visit via Doxy. me  The risks and benefits of converting to a virtual visit were discussed in light of the current infectious disease epidemic. Patient also understood that insurance coverage and co-pays are up to their individual insurance plans. Florance Aschoff is a 63 y.o. female being evaluated by a Virtual Visit (video visit) encounter to address concerns as mentioned above. A caregiver was present when appropriate. Due to this being a TeleHealth encounter (During [facility]-58 public health emergency), evaluation of the following organ systems was limited: Vitals/Constitutional/EENT/Resp/CV/GI//MS/Neuro/Skin/Heme-Lymph-Imm. Pursuant to the emergency declaration under the [address] [place] [address] and the [facility] and [employer] Act, this Virtual Visit was conducted with patient's (and/or legal guardian's) consent, to reduce the patient's risk of exposure to COVID-19 and provide necessary medical care. The patient (and/or legal guardian) has also been advised to contact this office for worsening conditions or problems, and seek emergency medical treatment and/or call 911 if deemed necessary. Patient identification was verified at the start of the visit: Yes    Total time spent for this encounter: Not billed by time    Services were provided through a video synchronous discussion virtually to substitute for in-person clinic visit. Patient and provider were located at their individual homes. The patient is talking with me virtually from her home and I am located at my office in [facility].        HPI: This patient reports no chest pain or pressure. There is no shortness of breath or cough. The patient reports no nausea or vomiting. There is no heartburn or indigestion. There is no diarrhea or constipation. No black, bloody, mucusy or tarry stool noticed. The patient reports no bloating and no change in appetite. No flank pain or hematuria reported. No dysuria. Prior to Visit Medications    Medication Sig Taking? Authorizing Provider   albuterol sulfate HFA (VENTOLIN HFA) 108 (90 Base) MCG/ACT inhaler Inhale 2 puffs into the lungs 4 times daily as needed for Wheezing Yes DRE Ham CNP   hydroCHLOROthiazide (MICROZIDE) 12.5 MG capsule Take 1 capsule by mouth every morning Yes DRE Ham CNP   sertraline (ZOLOFT) 100 MG tablet Take 1 tablet by mouth daily Yes DRE Ham CNP   montelukast (SINGULAIR) 10 MG tablet Take 1 tablet by mouth daily Yes DRE Ham CNP   metoprolol (LOPRESSOR) 100 MG tablet TAKE ONE TABLET BY MOUTH TWO TIMES A DAY Yes DRE Dawson CNP   metFORMIN (GLUCOPHAGE) 500 MG tablet Take 1 tablet by mouth daily (with breakfast) Yes DRE Ham CNP   glipiZIDE (GLUCOTROL XL) 10 MG extended release tablet TAKE ONE TABLET BY MOUTH EVERY DAY Yes DRE Ham CNP   cyclobenzaprine (FLEXERIL) 10 MG tablet Take 1 tablet by mouth 2 times daily as needed for Muscle spasms Yes DRE Ham CNP   HYDROcodone-acetaminophen (NORCO) 5-325 MG per tablet Take 1 tablet by mouth every 6 hours as needed for Pain for up to 7 days. Intended supply: 7 days. Take lowest dose possible to manage pain Yes DRE Ham CNP   traZODone (DESYREL) 100 MG tablet Take 1 tablet by mouth nightly Yes DRE Ham CNP   fluticasone (FLONASE) 50 MCG/ACT nasal spray 2 sprays by Nasal route daily Yes BAMBI Pickens   mometasone (ELOCON) 0.1 % ointment Apply topically daily.  Yes Zuhair Dewitt APRN - CNP Lactobacillus (PROBIOTIC ACIDOPHILUS) TABS Take 1 tablet by mouth 2 times daily Yes Portillo Holes Riedy, APRN - CNP   ipratropium (ATROVENT) 0.06 % nasal spray 2 sprays by Nasal route 3 times daily Yes Shannan Reyes MD   mometasone (ASMANEX 30 METERED DOSES) 220 MCG/INH inhaler Inhale 1 puff into the lungs daily Yes DRE Bennett - CNP   Cholecalciferol (VITAMIN D3) 5000 UNITS TABS Take 5,000 Units by mouth daily Yes Historical Provider, MD   ibuprofen (ADVIL;MOTRIN) 800 MG tablet Take 1 tablet by mouth every 6 hours as needed for Pain Yes Portillo Holes Riedy, APRN - CNP   FISH OIL 1,000 mg by Does not apply route daily. Yes Historical Provider, MD       Social History     Tobacco Use    Smoking status: Former Smoker     Packs/day: 2.00     Years: 30.00     Pack years: 60.00     Quit date: 2010     Years since quittin.1    Smokeless tobacco: Never Used   Substance Use Topics    Alcohol use: No    Drug use: No        PHYSICAL EXAMINATION:  [ INSTRUCTIONS:  \"[x]\" Indicates a positive item  \"[]\" Indicates a negative item  -- DELETE ALL ITEMS NOT EXAMINED]  [x] Alert  [x] Oriented to person/place/time    [x] No apparent distress  [] Toxic appearing    [] Face flushed appearing [] Sclera clear  [] Lips are cyanotic      [x] Breathing appears normal  [] Appears tachypneic      [] Rash on visible skin    [x] Cranial Nerves II-XII grossly intact    [x] Motor grossly intact in visible upper extremities    [] Motor grossly intact in visible lower extremities    [x] Normal Mood  [] Anxious appearing    [] Depressed appearing  [] Confused appearing      [] Poor short term memory  [] Poor long term memory    [x] OTHER:  - left knee swelling- no discoloration    Due to this being a TeleHealth encounter, evaluation of the following organ systems is limited: Vitals/Constitutional/EENT/Resp/CV/GI//MS/Neuro/Skin/Heme-Lymph-Imm.     ASSESSMENT/PLAN:   Diagnosis Orders

## 2021-06-11 ENCOUNTER — TELEPHONE (OUTPATIENT)
Dept: FAMILY MEDICINE CLINIC | Age: 66
End: 2021-06-11

## 2021-06-11 ENCOUNTER — OFFICE VISIT (OUTPATIENT)
Dept: FAMILY MEDICINE CLINIC | Age: 66
End: 2021-06-11

## 2021-06-11 VITALS
OXYGEN SATURATION: 95 % | DIASTOLIC BLOOD PRESSURE: 70 MMHG | TEMPERATURE: 96.7 F | RESPIRATION RATE: 16 BRPM | BODY MASS INDEX: 33.07 KG/M2 | HEART RATE: 74 BPM | HEIGHT: 61 IN | SYSTOLIC BLOOD PRESSURE: 128 MMHG

## 2021-06-11 DIAGNOSIS — M54.41 CHRONIC BILATERAL LOW BACK PAIN WITH BILATERAL SCIATICA: ICD-10-CM

## 2021-06-11 DIAGNOSIS — F33.1 MODERATE EPISODE OF RECURRENT MAJOR DEPRESSIVE DISORDER (HCC): ICD-10-CM

## 2021-06-11 DIAGNOSIS — M54.42 CHRONIC BILATERAL LOW BACK PAIN WITH BILATERAL SCIATICA: ICD-10-CM

## 2021-06-11 DIAGNOSIS — H65.06 RECURRENT ACUTE SEROUS OTITIS MEDIA OF BOTH EARS: Primary | ICD-10-CM

## 2021-06-11 DIAGNOSIS — G89.29 CHRONIC BILATERAL LOW BACK PAIN WITH BILATERAL SCIATICA: ICD-10-CM

## 2021-06-11 DIAGNOSIS — R42 DIZZINESS: ICD-10-CM

## 2021-06-11 DIAGNOSIS — J45.41 MODERATE PERSISTENT ASTHMA WITH ACUTE EXACERBATION: ICD-10-CM

## 2021-06-11 DIAGNOSIS — M62.830 MUSCLE SPASM OF BACK: ICD-10-CM

## 2021-06-11 DIAGNOSIS — E11.9 TYPE 2 DIABETES MELLITUS WITHOUT COMPLICATION, WITHOUT LONG-TERM CURRENT USE OF INSULIN (HCC): ICD-10-CM

## 2021-06-11 DIAGNOSIS — I10 ESSENTIAL HYPERTENSION: Chronic | ICD-10-CM

## 2021-06-11 LAB — HBA1C MFR BLD: 9.6 %

## 2021-06-11 PROCEDURE — 83036 HEMOGLOBIN GLYCOSYLATED A1C: CPT | Performed by: NURSE PRACTITIONER

## 2021-06-11 PROCEDURE — 99214 OFFICE O/P EST MOD 30 MIN: CPT | Performed by: NURSE PRACTITIONER

## 2021-06-11 RX ORDER — METOPROLOL TARTRATE 100 MG/1
TABLET ORAL
Qty: 180 TABLET | Refills: 1 | Status: SHIPPED | OUTPATIENT
Start: 2021-06-11 | Stop reason: SDUPTHER

## 2021-06-11 RX ORDER — MONTELUKAST SODIUM 10 MG/1
10 TABLET ORAL DAILY
Qty: 90 TABLET | Refills: 1 | Status: SHIPPED | OUTPATIENT
Start: 2021-06-11 | End: 2022-02-03

## 2021-06-11 RX ORDER — SERTRALINE HYDROCHLORIDE 100 MG/1
100 TABLET, FILM COATED ORAL DAILY
Qty: 180 TABLET | Refills: 1 | Status: SHIPPED | OUTPATIENT
Start: 2021-06-11 | Stop reason: SDUPTHER

## 2021-06-11 RX ORDER — HYDROCHLOROTHIAZIDE 12.5 MG/1
12.5 CAPSULE, GELATIN COATED ORAL EVERY MORNING
Qty: 90 CAPSULE | Refills: 1 | Status: SHIPPED | OUTPATIENT
Start: 2021-06-11 | Stop reason: SDUPTHER

## 2021-06-11 RX ORDER — MECLIZINE HYDROCHLORIDE 25 MG/1
25 TABLET ORAL 3 TIMES DAILY PRN
Qty: 30 TABLET | Refills: 0 | Status: SHIPPED | OUTPATIENT
Start: 2021-06-11 | End: 2021-06-21 | Stop reason: SDUPTHER

## 2021-06-11 RX ORDER — CYCLOBENZAPRINE HCL 10 MG
10 TABLET ORAL 2 TIMES DAILY PRN
Qty: 40 TABLET | Refills: 2 | Status: SHIPPED | OUTPATIENT
Start: 2021-06-11

## 2021-06-11 RX ORDER — AZITHROMYCIN 250 MG/1
TABLET, FILM COATED ORAL
Qty: 6 TABLET | Refills: 0 | Status: SHIPPED | OUTPATIENT
Start: 2021-06-11 | End: 2021-06-21

## 2021-06-11 RX ORDER — GLIPIZIDE 10 MG/1
TABLET, FILM COATED, EXTENDED RELEASE ORAL
Qty: 90 TABLET | Refills: 1 | Status: SHIPPED | OUTPATIENT
Start: 2021-06-11 | End: 2021-09-01 | Stop reason: SDUPTHER

## 2021-06-11 RX ORDER — ALBUTEROL SULFATE 90 UG/1
2 AEROSOL, METERED RESPIRATORY (INHALATION) 4 TIMES DAILY PRN
Qty: 1 INHALER | Refills: 2 | Status: SHIPPED | OUTPATIENT
Start: 2021-06-11

## 2021-06-11 ASSESSMENT — PATIENT HEALTH QUESTIONNAIRE - PHQ9
SUM OF ALL RESPONSES TO PHQ9 QUESTIONS 1 & 2: 0
SUM OF ALL RESPONSES TO PHQ QUESTIONS 1-9: 0
2. FEELING DOWN, DEPRESSED OR HOPELESS: 0
1. LITTLE INTEREST OR PLEASURE IN DOING THINGS: 0

## 2021-06-11 NOTE — PROGRESS NOTES
Abdulkadir Palmer is here for evaluation of pain in bilateral ear. It has been present for 2 day(s). The patient reports pain and soreness, sinus pressure but no fever or chills. The patient's hearing is normal/baseline. She has had dizziness for the past few days. She states that she is also had some sinus pain and pressure and feels that she might have fluid behind her ears. Diabetes: She states that she takes her medication for the most part but her diet has not been as good as it could be. She has not had any excessive thirst or urination. No low sugar episodes reported. Depression: Marleny Ramirez reports being in a good mood that is stable. The patient is not reporting insomnia, difficulty concentrating and usual interest in activities. This patient is not homicidal or suicidal.  He continues to take Zoloft routinely and has not had any medication side effects. Chronic low back pain: She states that chronic low back pain has been stable overall. She continues to take muscle relaxer as needed and does low back stretching exercises. Asthma: She states that she has not had any recent exacerbation of her asthma symptoms. She does not have much of a need for rescue inhaler. No significant chest tightness or wheezing reported. ROS: This patient reports no chest pain or pressure. There is no shortness of breath or cough. The patient reports no nausea or vomiting. There is no heartburn or indigestion. There is no diarrhea or constipation. No black, bloody, mucusy or tarry stool noticed. The patient reports no bloating and no change in appetite. There is no numbness, tingling or swelling in the extremities. EXAM:  Constitutional Blood pressure 128/70, pulse 74, temperature 96.7 °F (35.9 °C), temperature source Temporal, resp. rate 16, height 5' 1\" (1.549 m), last menstrual period 01/01/2007, SpO2 95 %, not currently breastfeeding. .  She has a normal affect, no acute distress, appears well Mood is been stable on current dose of Zoloft. No side effects reported. Continue same. 5.  Symptoms have been stable on combination of inhalers and Singulair. Continue the same. 6.  7.  Continue Flexeril to help with chronic back pain symptoms. Continue back stretching exercises. Instructions given:  The patient is instructed to take Probiotic tablets twice a day for the duration of antibiotic therapy and for 4 days after completion of antibiotics. This will help restore the good bacteria to your colon and prevent side effects of antibiotic therapy such as cramping and diarrhea. Probiotic tablets can be found at your local pharmacy over the counter. Ask your pharmacist if you need help finding tablets. Please note this report has been partially produced using speech recognition software and may cause contain errors related to that system including grammar, punctuation and spelling as well as words and phrases that may seem inappropriate. If there are questions or concerns please feel free to contact me to clarify.         Electronically signed by DRE Srivastava - CNP-CNP, 3:04 PM 6/11/21

## 2021-06-11 NOTE — TELEPHONE ENCOUNTER
Beth De Leon from pre-service called in this morning to give the message that the patient is having an earache and will not come into walk in clinic since she states she brought her friend in last week to ready clinic and was treated poorly and will never come back to any [facility] in again. She is demanding an appt today. Please advice.  Ab

## 2021-06-21 ENCOUNTER — TELEPHONE (OUTPATIENT)
Dept: FAMILY MEDICINE CLINIC | Age: 66
End: 2021-06-21

## 2021-06-21 DIAGNOSIS — R42 DIZZINESS: ICD-10-CM

## 2021-06-21 RX ORDER — DOXYCYCLINE HYCLATE 100 MG
100 TABLET ORAL 2 TIMES DAILY
Qty: 20 TABLET | Refills: 0 | Status: SHIPPED | OUTPATIENT
Start: 2021-06-21 | End: 2021-07-06 | Stop reason: ALTCHOICE

## 2021-06-21 RX ORDER — MECLIZINE HYDROCHLORIDE 25 MG/1
25 TABLET ORAL 3 TIMES DAILY PRN
Qty: 30 TABLET | Refills: 0 | Status: SHIPPED | OUTPATIENT
Start: 2021-06-21 | End: 2021-07-06 | Stop reason: SDUPTHER

## 2021-06-21 NOTE — TELEPHONE ENCOUNTER
Patient called stating that she is still having dizziness and pain in both ear. Patient was seen on 6/11/2021. She can be reached at [phone]. Pharmacy [facility] in [place].

## 2021-06-22 NOTE — TELEPHONE ENCOUNTER
1st attempt to reach patient.  Called patient @  [phone] and left message on machine for patient to return call during normal business hours of 8:30 AM and 5 PM @ [phone] option [email or web address]

## 2021-07-06 ENCOUNTER — VIRTUAL VISIT (OUTPATIENT)
Dept: FAMILY MEDICINE CLINIC | Age: 66
End: 2021-07-06

## 2021-07-06 DIAGNOSIS — R42 VERTIGO: ICD-10-CM

## 2021-07-06 DIAGNOSIS — R42 DIZZINESS: Primary | ICD-10-CM

## 2021-07-06 PROCEDURE — 99214 OFFICE O/P EST MOD 30 MIN: CPT | Performed by: FAMILY MEDICINE

## 2021-07-06 RX ORDER — MECLIZINE HYDROCHLORIDE 25 MG/1
25 TABLET ORAL 3 TIMES DAILY PRN
Qty: 21 TABLET | Refills: 0 | Status: SHIPPED | OUTPATIENT
Start: 2021-07-06 | End: 2021-07-13

## 2021-07-06 ASSESSMENT — ENCOUNTER SYMPTOMS
SINUS PRESSURE: 0
TROUBLE SWALLOWING: 0
VOICE CHANGE: 0
FACIAL SWELLING: 0
COUGH: 0
CHEST TIGHTNESS: 0
NAUSEA: 1
SORE THROAT: 0
SINUS PAIN: 0
WHEEZING: 0
SHORTNESS OF BREATH: 0
VOMITING: 0

## 2021-07-06 NOTE — ASSESSMENT & PLAN NOTE
I reviewed with patient the inherent limitations of evaluating dizziness via a virtual visit versus with a physical examination in the office. Patient was instructed to go to the emergency room immediately for any worsening severity of dizziness, dizziness associated with intractable nausea/vomiting, headache, vision changes, paresthesias, muscle weakness, difficulty with speech, or change in mental status. Due to reported similar symptoms in the past we will treat empirically for vertigo with home Epley maneuvers and formal vestibular/balance therapy which has helped resolve similar symptoms in the past.  Patient will keep close follow-up in the office in the next 2 to 4 weeks for reassessment. If dizziness persists, the next step would be referral to neurology.

## 2021-07-06 NOTE — PROGRESS NOTES
Ruddy Mcgraw (:  1955) is a 63 y.o. female,Established patient, here for evaluation of the following chief complaint(s): Dizziness (x2 weeks, pt noticed this problem right after recovering from ear infection. Pt was given antivert by PCP which has slightly relieved dizziness. )         ASSESSMENT/PLAN:  1. Dizziness  Assessment & Plan:  I reviewed with patient the inherent limitations of evaluating dizziness via a virtual visit versus with a physical examination in the office. Patient was instructed to go to the emergency room immediately for any worsening severity of dizziness, dizziness associated with intractable nausea/vomiting, headache, vision changes, paresthesias, muscle weakness, difficulty with speech, or change in mental status. Due to reported similar symptoms in the past we will treat empirically for vertigo with home Epley maneuvers and formal vestibular/balance therapy which has helped resolve similar symptoms in the past.  Patient will keep close follow-up in the office in the next 2 to 4 weeks for reassessment. If dizziness persists, the next step would be referral to neurology. Orders:  -     [facility] [place]  2. Vertigo  -     [facility] [place]  -     meclizine (ANTIVERT) 25 MG tablet; Take 1 tablet by mouth 3 times daily as needed for Dizziness, Disp-21 tablet, R-0Normal      Return for F/U DIZZINESS AND CHRONIC DISEASE CHECK WITH PCP (TOMMY) IN 2-4 WEEKS. SUBJECTIVE/OBJECTIVE:  HPI    Patient presents for virtual video visit with complaint of dizziness. She was treated for recurrent acute serous otitis media of both ears causing dizziness on 2021 with course initially of azithromycin. When ear pain and dizziness persisted 10 days later she was given a prescription for doxycycline and a course of meclizine for subsequent management on 2021. Patient reports completing both courses of antibiotic and completing course of meclizine. Patient reports her ear pain has resolved. She reports her dizziness is improved, but still noted mainly in the AM with mild nausea, but no emesis when she first gets out of bed and sits up. The dizziness is worse with fast movement of the head or looking upward. She denies any headaches, vision changes, paresthesias, muscle weakness, or confusion/change in mental status. She reports similar symptoms in the past with vertigo following URI/otitis media that have resolved with formal vestibular/balance therapy, most recently in 2019. Review of Systems   Constitutional: Negative for appetite change, chills, diaphoresis, fatigue and fever. HENT: Negative for congestion, ear discharge, ear pain, facial swelling, postnasal drip, sinus pressure, sinus pain, sore throat, tinnitus, trouble swallowing and voice change. Eyes: Negative for visual disturbance. Respiratory: Negative for cough, chest tightness, shortness of breath and wheezing. Cardiovascular: Negative for chest pain and palpitations. Gastrointestinal: Positive for nausea. Negative for vomiting. Skin: Negative for rash. Neurological: Positive for dizziness. Negative for tremors, seizures, syncope, facial asymmetry, speech difficulty, weakness, light-headedness, numbness and headaches. No flowsheet data found.      Physical Exam    [INSTRUCTIONS:  \"[x]\" Indicates a positive item  \"[]\" Indicates a negative item  -- DELETE ALL ITEMS NOT EXAMINED]    Constitutional: [x] Appears well-developed and well-nourished [x] No apparent distress      [] Abnormal -     Mental status: [x] Alert and awake  [x] Oriented to person/place/time [x] Able to follow commands    [] Abnormal -     Eyes:   EOM    [x]  Normal    [] Abnormal -   Sclera  [x]  Normal    [] Abnormal -          Discharge [x]  None visible   [] Abnormal -     HENT: [x] Normocephalic, atraumatic  [] Abnormal -   [x] Mouth/Throat: Mucous membranes are moist    External Ears [x] Normal  [] Abnormal -    Neck: [x] No visualized mass [] Abnormal -     Pulmonary/Chest: [x] Respiratory effort normal   [x] No visualized signs of difficulty breathing or respiratory distress        [] Abnormal -      Musculoskeletal:   [] Normal gait with no signs of ataxia         [] Normal range of motion of neck        [] Abnormal -     Neurological:        [x] No Facial Asymmetry (Cranial nerve 7 motor function) (limited exam due to video visit)          [x] No gaze palsy        [] Abnormal -          Skin:        [x] No significant exanthematous lesions or discoloration noted on facial skin         [] Abnormal -            Psychiatric:       [x] Normal Affect [] Abnormal -        [x] No Hallucinations    Other pertinent observable physical exam findings:-                 Brayden Vaca, was evaluated through a synchronous (real-time) audio-video encounter. The patient (or guardian if applicable) is aware that this is a billable service. Verbal consent to proceed has been obtained within the past 12 months. The visit was conducted pursuant to the emergency declaration under the [address] [place] [address] authority and the [employer] and [employer] General Act. Patient identification was verified, and a caregiver was present when appropriate. The patient was located in a state where the provider was credentialed to provide care. An electronic signature was used to authenticate this note.     --Godwin Sanon MD

## 2021-07-12 ENCOUNTER — HOSPITAL ENCOUNTER (OUTPATIENT)
Dept: PHYSICAL THERAPY | Age: 66
Setting detail: THERAPIES SERIES
Discharge: HOME | End: 2021-07-12

## 2021-07-12 PROCEDURE — 97112 NEUROMUSCULAR REEDUCATION: CPT

## 2021-07-12 PROCEDURE — 97530 THERAPEUTIC ACTIVITIES: CPT

## 2021-07-12 PROCEDURE — 97162 PT EVAL MOD COMPLEX 30 MIN: CPT

## 2021-07-12 ASSESSMENT — PAIN DESCRIPTION - FREQUENCY: FREQUENCY: INTERMITTENT

## 2021-07-12 ASSESSMENT — PAIN DESCRIPTION - DESCRIPTORS: DESCRIPTORS: ACHING

## 2021-07-12 ASSESSMENT — PAIN DESCRIPTION - LOCATION: LOCATION: HEAD

## 2021-07-12 ASSESSMENT — PAIN SCALES - GENERAL: PAINLEVEL_OUTOF10: 2

## 2021-07-12 NOTE — PROGRESS NOTES
Physical Therapy  Initial Assessment  Date: 2021  Patient Name: Yariel Sampson  MRN: [id number]  : 1955     Treatment Diagnosis: vertigo    Subjective   General  Chart Reviewed: Yes  Additional Pertinent Hx: fall 3 years ago in April-full thickness tear of Rc R S/P R rot cuff surgery; DM type II, history of sciatica, history of perforated ear drum L ear (surgery-unsuccessful)  Referring Practitioner: Ezekiel Riggins  Referral Date : 21  Diagnosis: vertigo  PT Visit Information  Total # of Visits Approved:  (4-8)  Total # of Visits to Date: 1  Plan of Care/Certification Expiration Date: 21  No Show: 0  Canceled Appointment: 0  Subjective  Subjective: Pt reports intermittent vertigo over the last 5-6 years lasting only a short time then would go away. Pt reports onset of loss of hearing amd pressure in both ears approx 1 month ago at which time pt describes difficult time hearing, increased ringing in the ears, and pressure bilat and was diagosed with inner ear infection bilat, pt was started on antibiotic. 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 Pt denies injury following the fall, also denies hitting head or LOC. Pt reports shortly after starting the antibiotic pt became to experience vertigo with changes in positions. Pt reports difficulty looking looking to the Right and looking up due to dizziness. Pt reports difficulty laying on R side due to severe dizziness. Pt also reports nausea with dizziness. Pt describes vertigo-room spinning with change in positions, also describes \"off balance\" while walking.   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 [employer]  Additional Comments: pt has developmentally disabled sister who pt takes care of    Objective   Spine  Cervical: AROM slow and guarded all planes, approx 60 deg rot bilat 20 deg SB bilat with mild dizziness duirng SB R, flex and ext slow and guarded ext produced mild dizziness    Strength RUE  Strength RUE: WFL  Comment: Neg myotomal testing  Strength LUE  Strength LUE: WFL  Comment: Neg myotomal testing      Subjective  Patient experiences spells of vertigo?: Yes  Describe Vertigo: Room Spinning; Loss of Balance  How long do these spells last?: Seconds (seconds up to a minute)  Vertigo Is: Induced by motion; Induced by position  Describe inducing motions/positions: laying down, rolling side to side, looking up, looking to the right  Patient experiences disequilibrium?: Yes  Disequilibrium is?: Induced by position changes; Worse in the dark  Associated hearing/ear symptoms: Yes  Hearing/Ear symptoms: Tinnitus;Pressure/fullness  Which Side?: Both Sides (bilat L >R)  Any recent Illness or Infections?: Yes  Describe: bilat ear infection L >R, finished antibiotic about a week ago  Any recent accidents or head trauma?: Yes  Describe: a fall about a month ago-did not hit head  Any history of migraines or headaches?: Yes  Describe: history of migraines, history of recent headaches 1-2x per wk  Oculomotor Examination  Oculomotor Examination: Yes  Spontaneous Nystagmus: No  Gaze Holding Nystagmus: No  Smooth Pursuits: WNL (normal smooth pursuits however increased intensity of headache)  Positional Testing  Right [place] Hallpike: Negative  Left Barry Hallpike: Vertigo  Describe/Comment: mild-mod vertigo during L DH position lasting 3-5 seconds no nystagmus observed in room light (vestibular goggles not available)  Right Roll Test: Negative  Left Roll Test: Negative    Ambulation  Ambulation?: Yes  Ambulation 1  Surface: level tile;carpet  Device: No Device  Assistance: Independent  Quality of Gait: slow unsteady gait veering R and L wide SADA, small step length bilat, very slow and guarded during turns, very little trunk rotation or arm swing during gait; one small LOB able to self correct I  Gait Deviations: Slow Kennedi; Increased SADA; Decreased step length;Decreased step height  Distance: 60 feet     Assessment   Conditions Requiring Skilled Therapeutic Intervention  Body structures, Functions, Activity limitations: Decreased functional mobility ; Decreased ROM; Vestibular Impairment;Decreased balance  Assessment: 63 yr old female presents to OP PT with diagnosis of vertigo exhibiting impaired dynamic balance during gait, L Hallpike Barry test positive for reproduction of mild -mod vertigo. Pt would benefit from PT for vestibular rehab and repositioning maneuvers to improve pt's ability to perform ADLs and overall functional mobility. Treatment Diagnosis: vertigo  Prognosis: Good  REQUIRES PT FOLLOW UP: Yes  Treatment Initiated : IE completed. Pt educated on therapy POC, pt educated on BPPV signs and sxs, Epley maneuver performed treating probable L post canal BPPV. Pt educated on safety/fall precautions and advised to refrain from quick positional changes/ quick sudden movements of head and or body, and to turn lights on at night when getting out of bed. Pt also advised to wait a few seconds after changing positions to assure no vertigo prior to initiating gait or change in position.        Plan   Plan  Times per week: 1-2  Current Treatment Recommendations: ROM, Balance Training, Functional Mobility Training, Home Exercise Program, Neuromuscular Re-education, Vestibular Rehab    Goals  Short term goals  Short term goal 1: Pt report 30% or greater reduction in dizziness during position changes  Long term goals  Long term goal 1: Negative positional testing for BPPV  Long term goal 2: AROM cervical spine WFL all planes without dizziness  Long term goal 3: Pt amb 300 feet or greater without AD demonstrating good balance with 1-2 episodes of slight veering R/L or less without reports of dizziness  Long term goal 4: Pt perform ADLs including position changes (supine to sit, rolling over in bed, sit to stand), looking up, bending over without experiencing vertigo       Therapy Time   Individual Concurrent Group Co-treatment   Time In  1000         Time Out  1105         Minutes  [place] , [place], Dusty DAVIS, [place]

## 2021-07-15 ENCOUNTER — HOSPITAL ENCOUNTER (OUTPATIENT)
Dept: PHYSICAL THERAPY | Age: 66
Setting detail: THERAPIES SERIES
Discharge: HOME | End: 2021-07-15

## 2021-07-15 PROCEDURE — 97530 THERAPEUTIC ACTIVITIES: CPT

## 2021-07-15 PROCEDURE — 97112 NEUROMUSCULAR REEDUCATION: CPT

## 2021-07-15 NOTE — PROGRESS NOTES
1: Alexandra Preston exs x 5 reps  (vcs and instruction in technique-handout provided)        Assessment:   Conditions Requiring Skilled Therapeutic Intervention  Body structures, Functions, Activity limitations: Decreased functional mobility ; Decreased ROM; Vestibular Impairment;Decreased balance  Assessment: Neg positional testing for BPPV-Neg supine roll test and neg [employer] R and L. Mild vertigo reproduced with positional changing supine to sit , R and L sidelying to sitting with no nystagmus observed. Initiated Candie Rosas and issued as part of HEP. Pt verbalized and demonstrated understanding of HEP. Pt also instructed in gaze stab duirng amb on even surface. Improved ability to maintain straight pathway when incorportating gaze stab. Reivewed importance of slow positional changes and refraining from quick changes in positions as welll as keeping nightlight on at night or turning lights on due to impaired balalnce in the dark. Also discussed taking Antivert PRN for dizziness if med seems to decrease intensity of dizziness.    Treatment Diagnosis: vertigo  Prognosis: Good  REQUIRES PT FOLLOW UP: Yes             Goals:  Short term goals  Time Frame for Short term goals: 2 wks  Short term goal 1: Pt report 30% or greater reduction in dizziness during position changes  Long term goals  Time Frame for Long term goals : 4 wks  Long term goal 1: Negative positional testing for BPPV  Long term goal 2: AROM cervical spine WFL all planes without dizziness  Long term goal 3: Pt amb 300 feet or greater without AD demonstrating good balance with 1-2 episodes of slight veering R/L or less without reports of dizziness  Long term goal 4: Pt perform ADLs including position changes (supine to sit, rolling over in bed, sit to stand), looking up, bending over without experiencing vertigo    Plan:     Cont per POC  Frequency and duration of tx  Days:  (1-2)  Weeks:  (3-4)     Therapy Time   Individual Concurrent Group Co-treatment   Time In  1300         Time Out  [address], [place], Dusty DAVIS, [address] [place] [address]

## 2021-07-22 ENCOUNTER — HOSPITAL ENCOUNTER (OUTPATIENT)
Dept: PHYSICAL THERAPY | Age: 66
Setting detail: THERAPIES SERIES
Discharge: HOME | End: 2021-07-22

## 2021-07-22 NOTE — PROGRESS NOTES
Physical Therapy  Daily Treatment Note  Date: 2021  Patient Name: Ifrah Harrell  MRN: [id number]     :   1955    Subjective:   General  Chart Reviewed: Yes  Additional Pertinent Hx: fall 3 years ago in April-full thickness tear of Rc R S/P R rot cuff surgery; DM type II, history of sciatica, history of perforated ear drum L ear (surgery-unsuccessful)  Referring Practitioner: Giovana Foster  PT Visit Information  Total # of Visits Approved:  (4-8)  Total # of Visits to Date: 2  Plan of Care/Certification Expiration Date: 21  No Show: 0  Canceled Appointment: 1  Subjective  Subjective: Pt canceled therapy session due to illness.                        Therapy Time   Individual Concurrent Group Co-treatment   Time In           Time Out           [address] [place] [address], [address] [place] [address]

## 2021-07-27 ENCOUNTER — HOSPITAL ENCOUNTER (OUTPATIENT)
Dept: PHYSICAL THERAPY | Age: 66
Setting detail: THERAPIES SERIES
Discharge: HOME | End: 2021-07-27

## 2021-07-27 PROCEDURE — 97112 NEUROMUSCULAR REEDUCATION: CPT

## 2021-07-27 PROCEDURE — 97140 MANUAL THERAPY 1/> REGIONS: CPT

## 2021-07-27 NOTE — PROGRESS NOTES
Physical Therapy  Daily Treatment Note  Date: 2021  Patient Name: Peewee Foreman  MRN: [id number]     :   1955    Subjective:   General  Chart Reviewed: Yes  Additional Pertinent Hx: fall 3 years ago in April-full thickness tear of Rc R S/P R rot cuff surgery; DM type II, history of sciatica, history of perforated ear drum L ear (surgery-unsuccessful)  Referring Practitioner: Sage Iqbal  PT Visit Information  Total # of Visits Approved:  (4-8)  Total # of Visits to Date: 3  Plan of Care/Certification Expiration Date: 21  No Show: 0  Canceled Appointment: 1  Subjective  Subjective: Pt reports dizziness worse in the morning feeling very off balance in the morning. Pt reports no really bad episodes last week of vertigo however very \"off balance\" when walking to the bathroom at the night. Pt reports she has been doing new exercise at night 3 reps, and once in the morning. Pt reports feeling less dizzy when doing exercises. Pt reports asthma very flared up with humidity and difficulty breathing. \"Dizziness and balance not too bad today\" Pt reports very stiff neck this morning, difficulty turning head at all due to intense neck pain. Pain Screening  Patient Currently in Pain: No (complaints of neck stiffness, no pain currently)  Vital Signs  Patient Currently in Pain: No (complaints of neck stiffness, no pain currently)       Treatment Activities:   Ambulation  Ambulation?: Yes  Ambulation 1  Surface: level tile;carpet  Device: No Device  Assistance: Independent  Quality of Gait:  slow pace, near equal step and stride length, slight veering R x 1-2 epidsodes no LOB, gait with head turns R and L no LOB, milld veering R x 2 able to self correct slight unsteadiness, no complaints of dizziness, described slightly \"off balance\" no vertigo. Gait Deviations: Slow Kennedi; Increased SADA; Decreased step length;Decreased step height  Distance: 300 feet  Comments: Focused on gaze stabilization during amb; instructed pt to work on gaze stab at home when walking on even surface with no obstabcles      Balance  Comments: Neg supine roll test for vertgo or nystagmus; Neg R [employer], Neg L [employer]. No dizziness or nystagmus observed in test position and no dizziness returning from test position to long sitting. Spine  Cervical: AROM cerv spine slow and guarded all planes, no dizziness or pain \"stiffness\" with mild pain/discomfort endrange bilat rot and Bilat SB     Exercises  Exercise 1: [place] Daroff exs x 3 reps  (vcs and instruction in technique-handout provided)      Manual therapy  PROM: Gentle PROM/stretching cerv spine bilat SB and bilat rotation  Soft Tissue Mobalization: MFR/STM cerv paraspinals, UTs, gentle manual cerv traction       Assessment:   Conditions Requiring Skilled Therapeutic Intervention  Body structures, Functions, Activity limitations: Decreased functional mobility ; Decreased ROM; Vestibular Impairment;Decreased balance  Assessment: Neg positional testnig for BPPV- neg [place] Hallpike R and L, neg supine roll test. Able to perform Rfeda Chalmette exs with less intense dizziness . Improved balance during gait with mild veering 1-2 episodes slightly worse with head rotation R and L. Less intense dizziness during Campa Daroff exs- good response to campa Daroff and encouraged cont perofrmance of Freda Chalmette ex as part of HEP. Manual therapy to cervical spine performed to address stiffness of neck-very guarded and limited ROM prior to manual therpay. Improved ROM with less pain and stiffness post manual therapy.    Treatment Diagnosis: vertigo  Prognosis: Good  REQUIRES PT FOLLOW UP: Yes      Goals:  Short term goals  Time Frame for Short term goals: 2 wks  Short term goal 1: Pt report 30% or greater reduction in dizziness during position changes  Long term goals  Time Frame for Long term goals : 4 wks  Long term goal 1: Negative positional testing for BPPV  Long term goal 2: AROM cervical spine WFL all planes without dizziness  Long term goal 3: Pt amb 300 feet or greater without AD demonstrating good balance with 1-2 episodes of slight veering R/L or less without reports of dizziness  Long term goal 4: Pt perform ADLs including position changes (supine to sit, rolling over in bed, sit to stand), looking up, bending over without experiencing vertigo    Plan:     Cont per POC  Frequency and duration of tx  Days:  (1-2)  Weeks:  (3-4)     Therapy Time   Individual Concurrent Group Co-treatment   Time In  1400         Time Out  1455         Minutes  Henry Hernandez, Dusty DAVIS, [address]

## 2021-07-29 ENCOUNTER — HOSPITAL ENCOUNTER (OUTPATIENT)
Dept: PHYSICAL THERAPY | Age: 66
Setting detail: THERAPIES SERIES
Discharge: HOME | End: 2021-07-29

## 2021-07-29 PROCEDURE — 97112 NEUROMUSCULAR REEDUCATION: CPT

## 2021-07-29 PROCEDURE — 97530 THERAPEUTIC ACTIVITIES: CPT

## 2021-07-29 PROCEDURE — 97140 MANUAL THERAPY 1/> REGIONS: CPT

## 2021-07-29 NOTE — PROGRESS NOTES
Physical Therapy  Daily Treatment Note  Date: 2021  Patient Name: Margaret Mcclure  MRN: [id number]     :   1955    Subjective:   General  Chart Reviewed: Yes  Additional Pertinent Hx: fall 3 years ago in April-full thickness tear of Rc R S/P R rot cuff surgery; DM type II, history of sciatica, history of perforated ear drum L ear (surgery-unsuccessful)  Referring Practitioner: Shelly Estrada  PT Visit Information  Total # of Visits Approved:  (4-8)  Total # of Visits to Date: 4  Plan of Care/Certification Expiration Date: 21  No Show: 0  Canceled Appointment: 1  Subjective  Subjective: Pt reports no nausea for the last week. Pt reports slight dizziness last night when getting up to go to the bathroom. Primary compliaint of LOB and occasional spinning dizziness-less intense and less frequent. Complaints of mild neck soreness/stiffness but reports neck feeling much better after last session. Pain Screening  Patient Currently in Pain: No (complaints of neck stiffness, no pain currently)  Vital Signs  Patient Currently in Pain: No (complaints of neck stiffness, no pain currently)       Treatment Activities:   Ambulation  Ambulation?: Yes  Ambulation 1  Surface: level tile;carpet  Device: No Device  Assistance: Independent  Quality of Gait: slow pace, able to maintain straight pathway majority of distance with 1-2 slight veering L, very slow and cautious during turns/ change in direction. Gait with head turns L sligth veering L, able to maintain straight pathway R rot, flex, ext,  Gait Deviations: Slow Kennedi; Increased SADA; Decreased step length;Decreased step height  Distance: 300 feet; 240 feet with head turns and return to gaze stab  Comments: Focused on gaze stabilization during amb; instructed pt to cont to work on gaze stab at home when walking on even surface with no obstabcles-hallway, etc.      Balance  Comments: Neg VOR testing (horizontal and vertigo).  Neg supine roll test, Neg R Finomial Neg ,  [place]on Hallpike L mild short duration vertigo with questionable nystagmus (very short period). Epley Maneuver performed x2 with tapping to L mastoid process treating probable L post canal BPPV. Spine  Cervical: AROM cerv rotation R cerv rotation 78 deg L  R 68 deg flex and ext WFL. Less guarding and no dizziness noted with cervical ROM      Manual therapy  Soft Tissue Mobalization: MFR/STM cerv paraspinals, UTs, gentle manual cerv traction in seated position post Epley Maneuver         Assessment:   Conditions Requiring Skilled Therapeutic Intervention  Body structures, Functions, Activity limitations: Decreased functional mobility ; Decreased ROM; Vestibular Impairment;Decreased balance  Assessment: Testing revealed possible L BPPV-very short duration questionalble nystagmus difficulty seeing the direction (pt closing eyes). Epley Maneuver performed treating probable L post canal BPPV. Advised pt to hold off on [address] for next 2 days and then pt advised to resume [employer] on Sat if dizziness returns. Focused on gaze stabilization which cont to improve ability to maintain stragiht pathway. Also encouraged no quick positional changes and no quick movement of head especialy for the next 24-48 hours (post Epley maneuver). Good response to manual therapy to cerv spine with less guarding during ROM and improved mobility, less stiffness. 0 pain and no complaints of dizziness post session.    Treatment Diagnosis: vertigo  Prognosis: Good  REQUIRES PT FOLLOW UP: Yes      Goals:  Short term goals  Time Frame for Short term goals: 2 wks  Short term goal 1: Pt report 30% or greater reduction in dizziness during position changes  Long term goals  Time Frame for Long term goals : 4 wks  Long term goal 1: Negative positional testing for BPPV  Long term goal 2: AROM cervical spine WFL all planes without dizziness  Long term goal 3: Pt amb 300 feet or greater without AD demonstrating good balance with 1-2 episodes of slight veering R/L or less without reports of dizziness  Long term goal 4: Pt perform ADLs including position changes (supine to sit, rolling over in bed, sit to stand), looking up, bending over without experiencing vertigo    Plan:       Frequency and duration of tx  Days:  (1-2)  Weeks:  (3-4)     Therapy Time   Individual Concurrent Group Co-treatment   Time In  900         Time Out  955         Minutes  Henry Hernandez, [address]

## 2021-08-03 ENCOUNTER — HOSPITAL ENCOUNTER (OUTPATIENT)
Dept: PHYSICAL THERAPY | Age: 66
Setting detail: THERAPIES SERIES
Discharge: HOME | End: 2021-08-03

## 2021-08-03 PROCEDURE — 97530 THERAPEUTIC ACTIVITIES: CPT

## 2021-08-03 PROCEDURE — 97112 NEUROMUSCULAR REEDUCATION: CPT

## 2021-08-03 ASSESSMENT — PAIN SCALES - GENERAL: PAINLEVEL_OUTOF10: 3

## 2021-08-03 ASSESSMENT — PAIN DESCRIPTION - LOCATION: LOCATION: HEAD

## 2021-08-03 ASSESSMENT — PAIN DESCRIPTION - PAIN TYPE: TYPE: ACUTE PAIN

## 2021-08-03 NOTE — PROGRESS NOTES
Physical Therapy  Daily Treatment Note  Date: 8/3/2021  Patient Name: Jess Harris  MRN: [id number]     :   1955    Subjective:   General  Chart Reviewed: Yes  Additional Pertinent Hx: fall 3 years ago in April-full thickness tear of Rc R S/P R rot cuff surgery; DM type II, history of sciatica, history of perforated ear drum L ear (surgery-unsuccessful)  Referring Practitioner: Pato Estes  PT Visit Information  Total # of Visits Approved:  (4-8)  Total # of Visits to Date: 5  Plan of Care/Certification Expiration Date: 21  No Show: 0  Canceled Appointment: 1  Subjective  Subjective: Pt reports she felt great this weekend up until this morning, woke up with significant dizziness described as \"room spinning\". Pt had difficulty walking due to feeling very off balance, headache this monning  General Comment  Comments: Pt presents to therapy very unsteady veering R and L slow pace wide SADA  Pain Screening  Patient Currently in Pain: Yes  Pain Assessment  Pain Assessment: 0-10  Pain Level: 3  Pain Type: Acute pain  Pain Location: Head  Pain Orientation:  (frontal headache)  Vital Signs  Patient Currently in Pain: Yes       Treatment Activities:    Ambulation  Ambulation?: Yes  More Ambulation?: Yes  Ambulation 1  Surface: level tile;carpet  Device: No Device  Assistance: Independent  Quality of Gait: slow pace, wide SADA, veering R and L frequently difficulty maintaining straight pathway, very slow and guarded with arms held close to side  Gait Deviations: Slow Kennedi; Increased SADA; Decreased step length;Decreased step height  Distance: 120 feet  Comments: Gait prior to repositioning maneuver (L LE IR (from old injury per pt)  Ambulation 2  Surface - 2: level tile;carpet  Assistance 2:  Independent  Quality of Gait 2: Amb post Epley Maneuver: improved ability to maintain straight pathway with slight veering R and L x 3 with improved arm swing and faster pace, No reports ofdizziness and significant less \"unsteadiness\" reported during amb post Epley  Comments: L LE IR (from old injury per pt)      Balance  Comments: Neg VOR testing (horizontal and vertigo). Neg supine roll test, Neg R Barry Hallpike for nystagmus however complaints of dizziness 4/10 ,  Barry Hallpike L  vertigo 4-5/10 -no observable nystagmus however pt blinking frequently difficult to assess; Epley Maneuver performed x2 with tapping to L mastoid process treating probable L post canal BPPV. Post Epley Maneuver pt felt slight dizziness when turning head to the right in sitting position. Reassessed R Barry Hallpike -Negative for dizziness and neg for nystagmus       Assessment:   Conditions Requiring Skilled Therapeutic Intervention  Body structures, Functions, Activity limitations: Decreased functional mobility ; Decreased ROM; Vestibular Impairment;Decreased balance  Assessment: Good response to last treatment with nearly no dizziness over the weekend however exacerbation of vertigo this morning upon waking. Pt demonstrated very unsteady gait upon presenting to therapy dept. No observable nystagmus in room light during testing however complaints of dizziness described as vertigo in both [employer] positions. Treated L ear still suspecting probable L post canal or possible L cupulolithiasis. Post repositioning maneuver, balance much improved during gait with significant less veering R and L and no headache or pain post session. Dizziness also resolved. [employer] R also neg for dizziness after treating L ear. Reinforced pt education regarding refraining from quick position changes or quickhead movements. Also encouraged semi-upright sleeping position tonight. No complaints of dizziness, headache or neck pain post session.    Treatment Diagnosis: vertigo  Prognosis: Good  REQUIRES PT FOLLOW UP: Yes          Goals:  Short term goals  Time Frame for Short term goals: 2 wks  Short term goal 1: Pt report 30% or greater reduction in dizziness during position changes  Long term goals  Time Frame for Long term goals : 4 wks  Long term goal 1: Negative positional testing for BPPV  Long term goal 2: AROM cervical spine WFL all planes without dizziness  Long term goal 3: Pt amb 300 feet or greater without AD demonstrating good balance with 1-2 episodes of slight veering R/L or less without reports of dizziness  Long term goal 4: Pt perform ADLs including position changes (supine to sit, rolling over in bed, sit to stand), looking up, bending over without experiencing vertigo    Plan:     Cont per POC  Frequency and duration of tx  Days:  (1-2)  Weeks:  (3-4)     Therapy Time   Individual Concurrent Group Co-treatment   Time In  900         Time Out  950         Minutes  [facility] [place], [place], Yane Lu

## 2021-08-05 ENCOUNTER — HOSPITAL ENCOUNTER (OUTPATIENT)
Dept: PHYSICAL THERAPY | Age: 66
Setting detail: THERAPIES SERIES
Discharge: HOME | End: 2021-08-05

## 2021-08-05 NOTE — PROGRESS NOTES
Physical Therapy  Daily Treatment Note  Date: 2021  Patient Name: Samir Kirby  MRN: [id number]     :   1955    Subjective:   General  Chart Reviewed: Yes  Additional Pertinent Hx: fall 3 years ago in April-full thickness tear of Rc R S/P R rot cuff surgery; DM type II, history of sciatica, history of perforated ear drum L ear (surgery-unsuccessful)  Referring Practitioner: Elke Gaines  PT Visit Information  Total # of Visits Approved:  (4-8)  Total # of Visits to Date: 5  Plan of Care/Certification Expiration Date: 21  No Show: 0  Canceled Appointment: 2  Subjective  Subjective: Pt canceled therapy session.             Therapy Time   Individual Concurrent Group Co-treatment   Time In           Time Out           [address] [place] [address], [address] [place] [address]

## 2021-08-12 ENCOUNTER — HOSPITAL ENCOUNTER (OUTPATIENT)
Dept: PHYSICAL THERAPY | Age: 66
Setting detail: THERAPIES SERIES
Discharge: HOME | End: 2021-08-12

## 2021-08-12 PROCEDURE — 97112 NEUROMUSCULAR REEDUCATION: CPT

## 2021-08-12 PROCEDURE — 97530 THERAPEUTIC ACTIVITIES: CPT

## 2021-08-12 PROCEDURE — 97140 MANUAL THERAPY 1/> REGIONS: CPT

## 2021-08-12 ASSESSMENT — PAIN SCALES - GENERAL: PAINLEVEL_OUTOF10: 3

## 2021-08-12 ASSESSMENT — PAIN DESCRIPTION - ORIENTATION: ORIENTATION: RIGHT

## 2021-08-12 NOTE — PROGRESS NOTES
Physical Therapy  Monthly Recheck  Date: 2021  Patient Name: Ifrah Harrell  MRN: [id number]     :   1955    Subjective:   General  Chart Reviewed: Yes  Additional Pertinent Hx: fall 3 years ago in April-full thickness tear of Rc R S/P R rot cuff surgery; DM type II, history of sciatica, history of perforated ear drum L ear (surgery-unsuccessful)  Referring Practitioner: Giovana Foster  PT Visit Information  Total # of Visits Approved:  (8-10)  Total # of Visits to Date: 6  Plan of Care/Certification Expiration Date: 09/10/21  No Show: 1  Canceled Appointment: 2  Subjective  Subjective: Pt reprots near 80% improvement since initiating therapy. Pt cont to report intermittent mild dizziness described as \"off \" with looking up, rolling over in bed and sittingup in bed. Compaints of neck pain and stiffness this morning. Comments: Pt failed to arrive to 9:00 appt. PT  called pt and pt had forgotten about appt and rescheduled for 12:00 today. Pain Screening  Patient Currently in Pain: Yes  Pain Assessment  Pain Assessment: 0-10  Pain Level: 3  Pain Location:  (R shoulder pain 4/10 neck 3/10)  Pain Orientation: Right  Vital Signs  Patient Currently in Pain: Yes       Treatment Activities:   Ambulation  Ambulation?: Yes  More Ambulation?: Yes  Ambulation 1  Surface: level tile;carpet  Device: No Device  Assistance: Independent  Quality of Gait: Good pace, near equal step and stride legnth, Mild veering 3 episodes 1 R and 2 left without any LOB. No complaints of lightheadedness or dizziness during gait. Slow pace during turns to change direction however no complaints of dizziness  Gait Deviations: Slow Kennedi; Increased SADA; Decreased step length;Decreased step height  Distance: 300 feet      Balance  Comments: Neg VOR testing, Neg supine roll test, Neg R and L hallpike [place] test for dizziness and nystagmus however mild dizziness described as \"off\" returning from [address] position to long sitting which resolved after 2-3 seconds. Spine  Cervical: AROM cerv rotation R cerv rotation 82 deg L  Rot 75 deg R SB 35 deg L sb 38 deg flex and ext WFL. mild endrange pain bilat rotation and bilat SB mild dizziness described as \"off\" with cerv ext. Strength RUE  Strength RUE: WFL  Comment: Neg myotomal testing  Strength LUE  Strength LUE: WFL  Comment: Neg myotomal testing  DHI= 14%  Exercises  Exercise 1: Verbally reviewed Reatha Angelica Daroff exs      Manual therapy  PROM: Gentle PROM/stretching cerv spine bilat SB and bilat rotation  Soft Tissue Mobalization: MFR/STM cerv paraspinals, UTs, gentle manual cerv traction in supine position      Assessment:   Conditions Requiring Skilled Therapeutic Intervention  Body structures, Functions, Activity limitations: Decreased functional mobility ; Decreased ROM; Vestibular Impairment;Decreased balance  Assessment: Great progress with decreasing frequency and decreasing intensity of dizziness. No episodes of vertigo in over a week, dizziness now described as \"off\" feeling. STG met, LTGs partially met. Recommend decrease frequency to 1x per wk  and cont PT 2-4 wks to improve overall functional mobility. Treatment Diagnosis: vertigo  Prognosis: Good  Exam: Reassessment completed. Reviewed HEP-ramos'daroff exs and advised to resume exs if beginning to feel dizziness when sitting up or laying down.  Reviewed safety/fall precautios and encouraged cont slow position changes refraining from quick movements of head or body (bending over, looking up etc.)  REQUIRES PT FOLLOW UP: Yes           Goals:  Short term goals  Time Frame for Short term goals: 2 wks  Short term goal 1: Pt report 30% or greater reduction in dizziness during position changes (GOAL  met)  Long term goals  Time Frame for Long term goals :  (2-4 wks from date of recheck on 8/12/21)  Long term goal 1: Negative positional testing for BPPV (Nearly met)  Long term goal 2: AROM cervical spine WFL all planes without dizziness (GOAL nearly met- WFL, slight dizziness cerv ext)  Long term goal 3: Pt amb 300 feet or greater without AD demonstrating good balance with 1-2 episodes of slight veering R/L or less without reports of dizziness (GOAL nearly met)  Long term goal 4: Pt perform ADLs including position changes (supine to sit, rolling over in bed, sit to stand), looking up, bending over without experiencing vertigo (GOAL partially met)    Plan:     Cont per POC 1x per wk x 2-4 wks then D/C  Frequency and duration of tx  Days:  (1)  Weeks:  (2-4)     Therapy Time   Individual Concurrent Group Co-treatment   Time In  1205         Time Out  1255         Minutes  [address] [place] [address], Yane Lu

## 2021-08-17 ENCOUNTER — HOSPITAL ENCOUNTER (OUTPATIENT)
Dept: PHYSICAL THERAPY | Age: 66
Setting detail: THERAPIES SERIES
Discharge: HOME | End: 2021-08-17

## 2021-08-17 PROCEDURE — 97140 MANUAL THERAPY 1/> REGIONS: CPT

## 2021-08-17 PROCEDURE — 97530 THERAPEUTIC ACTIVITIES: CPT

## 2021-08-17 PROCEDURE — 97112 NEUROMUSCULAR REEDUCATION: CPT

## 2021-08-17 ASSESSMENT — PAIN SCALES - GENERAL: PAINLEVEL_OUTOF10: 4

## 2021-08-17 ASSESSMENT — PAIN DESCRIPTION - ORIENTATION: ORIENTATION: LEFT

## 2021-08-17 NOTE — PROGRESS NOTES
Physical Therapy  Daily Treatment Note  Date: 2021  Patient Name: Elba Guy  MRN: [id number]     :   1955    Subjective:   General  Chart Reviewed: Yes  Additional Pertinent Hx: fall 3 years ago in April-full thickness tear of Rc R S/P R rot cuff surgery; DM type II, history of sciatica, history of perforated ear drum L ear (surgery-unsuccessful)  Referring Practitioner: Santos Pro  PT Visit Information  Total # of Visits Approved:  (8-10)  Total # of Visits to Date: 7  Plan of Care/Certification Expiration Date: 09/10/21  No Show: 1  Canceled Appointment: 2  Subjective  Subjective: Pt reports flare-up of sciatica over the weekend  pt was stretching overhead moving fabric and bending over and straightening fabric and later that night pt was in a lot pain in low back and down L leg. Pt was leaving a friends house after getting out of the swimming pool and L leg buckled and gave out and pt lowered herself to the ground. Pt was assisted up from the floor and went home- cont to experience significant LBP and L LE sciatic pain over the weekend. Pt only reports one episode of dizziness yesterday while bending over which lasted a short period of time. Pain Screening  Patient Currently in Pain: Yes  Pain Assessment  Pain Assessment: 0-10  Pain Level: 4  Pain Location:  (back and L LE pain 4/10 and complaints of neck stiffness)  Pain Orientation: Left  Vital Signs  Patient Currently in Pain: Yes       Treatment Activities:   Ambulation  Ambulation?: Yes  Ambulation 1  Surface: level tile;carpet  Device: No Device  Assistance: Independent  Quality of Gait: Mild antalgic gait pattern with decreased stance L LE however no unsteadiness or veering noted during amb down straight pathway. Gait with head turns/cerv rot R, L, cerv flex and ext without complaints of dizziness or LOB/unsteadiness. Distance: 240 feet        Balance  Comments: NEg supine roll test,.  Pt able to transition supine to sit, sit to supine, rolling R and L without dizziness. Toy Goldberg deferred due to back and LE pain. Spine  Cervical: AROM cerv spine WFL all planes with reports of \"stiffness\" end range, no dizziness  reproduced all planes including cerv ext      Manual therapy  PROM: Gentle PROM/stretching cerv spine bilat SB and bilat rotation  Soft Tissue Mobalization: MFR/STM cerv paraspinals, UTs, gentle manual cerv traction in supine position      Assessment:   Conditions Requiring Skilled Therapeutic Intervention  Body structures, Functions, Activity limitations: Decreased functional mobility ; Decreased ROM; Vestibular Impairment;Decreased balance  Assessment: No reproduction of dizziness during supine roll test, sit to supine, supine to sit or durng gait with head movements (rotation bilat and cerv sb bilat. Toy Goldberg deferred due to back and sciatic pain. Significant less \"stiffness\" of neck/shoulders  reported following manual therapy, Instructed in slow gentle AROM of cervcial spine for management of stiffness. Continued to educate on importance of refraining from quick position changes (bending over, getting out of bed etc.) and quick head moveemnts Reviewed correct lifting technique and encouraged frequent position changes due to new exacerbation of low back and LE pain. Also advised to followup with MD for assessment of low back/ LE pain if sxs persist. Pt encouraged to refrain from combination of twisting and bending at spine for joint protection. Pt advised to  defer from [place] the next few days due to LBP and L LE sciatic pain however advised to return to [place] once LBP subsides if dizziness returns and to call and schedule appt next week if experiencing exacerbation of vertigo/dizziness not resolved with [place].    Treatment Diagnosis: vertigo  Prognosis: Good  REQUIRES PT FOLLOW UP: Yes             Goals:  Short term goals  Time Frame for Short term goals: 2 wks  Short term goal 1: Pt report 30% or greater reduction in dizziness during position changes (GOAL  met)  Long term goals  Time Frame for Long term goals :  (2-4 wks from date of recheck on 8/12/21)  Long term goal 1: Negative positional testing for BPPV  Long term goal 2: AROM cervical spine WFL all planes without dizziness  Long term goal 3: Pt amb 300 feet or greater without AD demonstrating good balance with 1-2 episodes of slight veering R/L or less without reports of dizziness  Long term goal 4: Pt perform ADLs including position changes (supine to sit, rolling over in bed, sit to stand), looking up, bending over without experiencing vertigo    Plan:     Cont per POC- Hold PT unless exacerbation of dizziness occurs due to new onset of LBP/sciatica. Pt advised to call and schedule appt in next 1-2 weeks if exacerbation of vertigo occurs.    Frequency and duration of tx  Days:  (1)  Weeks:  (2-4)     Therapy Time   Individual Concurrent Group Co-treatment   Time In  1400         Time Out  1445         Minutes  [place], [place], Dusty DAVIS, [place]

## 2021-08-24 ENCOUNTER — APPOINTMENT (OUTPATIENT)
Dept: PHYSICAL THERAPY | Age: 66
End: 2021-08-24

## 2021-09-01 DIAGNOSIS — E11.9 TYPE 2 DIABETES MELLITUS WITHOUT COMPLICATION, WITHOUT LONG-TERM CURRENT USE OF INSULIN (HCC): ICD-10-CM

## 2021-09-01 NOTE — TELEPHONE ENCOUNTER
Patient is requesting medication refill. Please approve or deny this request.    Rx requested:  Requested Prescriptions     Pending Prescriptions Disp Refills    glipiZIDE (GLUCOTROL XL) 10 MG extended release tablet 90 tablet 1     Sig: TAKE ONE TABLET BY MOUTH EVERY DAY         Last Office Visit:   6/11/2021      Next Visit Date:  No future appointments.

## 2021-09-02 RX ORDER — GLIPIZIDE 10 MG/1
TABLET, FILM COATED, EXTENDED RELEASE ORAL
Qty: 90 TABLET | Refills: 1 | Status: SHIPPED | OUTPATIENT
Start: 2021-09-02 | Stop reason: SDUPTHER

## 2021-09-03 DIAGNOSIS — I10 ESSENTIAL HYPERTENSION: Chronic | ICD-10-CM

## 2021-09-03 DIAGNOSIS — E11.9 TYPE 2 DIABETES MELLITUS WITHOUT COMPLICATION, WITHOUT LONG-TERM CURRENT USE OF INSULIN (HCC): ICD-10-CM

## 2021-09-03 DIAGNOSIS — F33.1 MODERATE EPISODE OF RECURRENT MAJOR DEPRESSIVE DISORDER (HCC): ICD-10-CM

## 2021-09-03 RX ORDER — METOPROLOL TARTRATE 100 MG/1
TABLET ORAL
Qty: 180 TABLET | Refills: 1 | Status: SHIPPED | OUTPATIENT
Start: 2021-09-03 | End: 2022-04-22 | Stop reason: SDUPTHER

## 2021-09-03 RX ORDER — SERTRALINE HYDROCHLORIDE 100 MG/1
100 TABLET, FILM COATED ORAL DAILY
Qty: 180 TABLET | Refills: 1 | Status: SHIPPED | OUTPATIENT
Start: 2021-09-03 | End: 2022-04-25 | Stop reason: SDUPTHER

## 2021-09-03 RX ORDER — HYDROCHLOROTHIAZIDE 12.5 MG/1
12.5 CAPSULE, GELATIN COATED ORAL EVERY MORNING
Qty: 90 CAPSULE | Refills: 1 | Status: SHIPPED | OUTPATIENT
Start: 2021-09-03 | End: 2022-04-25

## 2021-09-03 RX ORDER — GLIPIZIDE 10 MG/1
TABLET, FILM COATED, EXTENDED RELEASE ORAL
Qty: 90 TABLET | Refills: 1 | Status: SHIPPED | OUTPATIENT
Start: 2021-09-03 | End: 2022-02-03 | Stop reason: SDUPTHER

## 2021-09-03 NOTE — TELEPHONE ENCOUNTER
Patient states this needs canceled at [facility] and needs sent to [facility]. I removed [employer] from pharmacy prefences, per patient request.     She states she also requested 4 other prescriptions besides the glipizide on Wednesday when she made the request and was upset that these were not in her original request. She needs this asap. Please advise.

## 2022-02-03 ENCOUNTER — TELEMEDICINE (OUTPATIENT)
Dept: FAMILY MEDICINE CLINIC | Age: 67
End: 2022-02-03

## 2022-02-03 DIAGNOSIS — G89.29 CHRONIC PAIN OF BOTH KNEES: ICD-10-CM

## 2022-02-03 DIAGNOSIS — M54.42 CHRONIC BILATERAL LOW BACK PAIN WITH BILATERAL SCIATICA: ICD-10-CM

## 2022-02-03 DIAGNOSIS — E11.9 TYPE 2 DIABETES MELLITUS WITHOUT COMPLICATION, WITHOUT LONG-TERM CURRENT USE OF INSULIN (HCC): Primary | ICD-10-CM

## 2022-02-03 DIAGNOSIS — F33.1 MODERATE EPISODE OF RECURRENT MAJOR DEPRESSIVE DISORDER (HCC): ICD-10-CM

## 2022-02-03 DIAGNOSIS — M25.562 CHRONIC PAIN OF BOTH KNEES: ICD-10-CM

## 2022-02-03 DIAGNOSIS — M25.561 CHRONIC PAIN OF BOTH KNEES: ICD-10-CM

## 2022-02-03 DIAGNOSIS — I10 ESSENTIAL HYPERTENSION: ICD-10-CM

## 2022-02-03 DIAGNOSIS — M54.41 CHRONIC BILATERAL LOW BACK PAIN WITH BILATERAL SCIATICA: ICD-10-CM

## 2022-02-03 DIAGNOSIS — G89.29 CHRONIC BILATERAL LOW BACK PAIN WITH BILATERAL SCIATICA: ICD-10-CM

## 2022-02-03 PROCEDURE — 99214 OFFICE O/P EST MOD 30 MIN: CPT | Performed by: NURSE PRACTITIONER

## 2022-02-03 RX ORDER — GLIPIZIDE 10 MG/1
TABLET, FILM COATED, EXTENDED RELEASE ORAL
Qty: 180 TABLET | Refills: 1 | Status: SHIPPED | OUTPATIENT
Start: 2022-02-03 | End: 2022-04-25

## 2022-02-03 NOTE — PROGRESS NOTES
2/3/2022    TELEHEALTH EVALUATION -- Audio/Visual (During [facility] public health emergency)    Due to COVID 19 outbreak, patient's office visit was converted to a virtual visit. Patient was contacted and agreed to proceed with a virtual visit via [employer]. me  The risks and benefits of converting to a virtual visit were discussed in light of the current infectious disease epidemic. Patient also understood that insurance coverage and co-pays are up to their individual insurance plans. Hong Warner, was evaluated through a synchronous (real-time) audio-video encounter. The patient (or guardian if applicable) is aware that this is a billable service, which includes applicable co-pays. This Virtual Visit was conducted with patient's (and/or legal guardian's) consent. The visit was conducted pursuant to the emergency declaration under the [address] [place] [address], [address] [place] [address] authority and the [employer] General Act. Patient identification was verified, and a caregiver was present when appropriate. The patient was located at home in a state where the provider was licensed to provide care. Total time spent for this encounter: Not billed by time    The patient is talking with me virtually from her home and I am located at my office in [facility]. HPI:    Hong Warner (:  1955) has requested an audio/video evaluation for the following concern(s):    Diabetes: she has had some increased glucose levels lately. She has been having some dry mouth and increased urination. She has been taking Metformin once per day as well as the glipizide. Is wondering if maybe she should increase 1 of these medications. Has noticed that her sugar has been up to 300 at times. Typically not under 100. She has not had any low sugar episodes. Does try to eat routinely. HTN: She states that her levels have been running good.  Has her blood pressure checks at drug store. No medication side effects. She does get some routine activity. Low salt diet overall. Depression: Sukhdev Tyler reports being in a fair mood that is stable. The patient is not reporting insomnia, difficulty concentrating and usual interest in activities. This patient is not homicidal or suicidal.    Allergic rhinitis/asthma: takes rescue inhaler about 2 times per week. Worse when she works at the [employer] when she goes there [address] and [address]. She does not tolerate antihistamines well and the Singulair really did not work well for her. Overall she is doing okay with her breathing. Back pain: has been worse with the colder weather. Typically pain goes down the left side. She currently does not have insurance and has been taking over-the-counter medication for pain. Has been doing low back stretching exercises. Bilateral knee pain: she has had some occasional pressure in both knees feeling like someone is trying to pry her knee caps off. She has not noticed of her joints get swollen or red. She will pay attention to when the symptoms happen and will notify me. Review of Systems   This patient reports no chest pain or pressure. There is no shortness of breath or cough. The patient reports no nausea or vomiting. There is no heartburn or indigestion. There is no diarrhea or constipation. No black, bloody, mucusy or tarry stool noticed. The patient reports no bloating and no change in appetite. There is no numbness, tingling or swelling in the extremities. Prior to Visit Medications    Medication Sig Taking?  Authorizing Provider   glipiZIDE (GLUCOTROL XL) 10 MG extended release tablet TAKE ONE TABLET BID Yes DRE Neri CNP   metFORMIN (GLUCOPHAGE) 500 MG tablet Take 1 tablet by mouth 2 times daily (with meals) Yes DRE Neri CNP   metoprolol (LOPRESSOR) 100 MG tablet TAKE ONE TABLET BY MOUTH TWO TIMES A DAY Yes Jovanna Dixon DRE Salvador CNP   sertraline (ZOLOFT) 100 MG tablet Take 1 tablet by mouth daily Yes DRE Hernandez CNP   hydroCHLOROthiazide (MICROZIDE) 12.5 MG capsule Take 1 capsule by mouth every morning Yes DRE Hernandez CNP   albuterol sulfate HFA (VENTOLIN HFA) 108 (90 Base) MCG/ACT inhaler Inhale 2 puffs into the lungs 4 times daily as needed for Wheezing Yes DRE Hernandez CNP   traZODone (DESYREL) 100 MG tablet Take 1 tablet by mouth nightly Yes DRE Hernandez CNP   fluticasone (FLONASE) 50 MCG/ACT nasal spray 2 sprays by Nasal route daily Yes BAMBI Pickens   mometasone (ELOCON) 0.1 % ointment Apply topically daily. Yes DRE Hernandez CNP   Lactobacillus (PROBIOTIC ACIDOPHILUS) TABS Take 1 tablet by mouth 2 times daily Yes DRE Hernandez CNP   Cholecalciferol (VITAMIN D3) 5000 UNITS TABS Take 5,000 Units by mouth daily Yes Historical Provider, MD   ibuprofen (ADVIL;MOTRIN) 800 MG tablet Take 1 tablet by mouth every 6 hours as needed for Pain Yes DRE Hernandez CNP   FISH OIL 1,000 mg by Does not apply route daily.  Yes Historical Provider, MD   glipiZIDE (GLUCOTROL XL) 10 MG extended release tablet TAKE ONE TABLET BY MOUTH EVERY DAY  DRE Dawson CNP   cyclobenzaprine (FLEXERIL) 10 MG tablet Take 1 tablet by mouth 2 times daily as needed for Muscle spasms  DRE Hernandez CNP   metoprolol (LOPRESSOR) 100 MG tablet TAKE ONE TABLET BY MOUTH TWO TIMES A DAY  DRE Dawson CNP   hydroCHLOROthiazide (MICROZIDE) 12.5 MG capsule Take 1 capsule by mouth every morning  DRE Hernandez CNP   metFORMIN (GLUCOPHAGE) 500 MG tablet Take 1 tablet by mouth daily (with breakfast)  DRE Hernandez CNP   sertraline (ZOLOFT) 100 MG tablet Take 1 tablet by mouth daily  Tata Salvador, APRN - CNP       Social History     Tobacco Use    Smoking status: Former Smoker     Packs/day: 2.00     Years: 30.00     Pack years: 60.00 Quit date: 2010     Years since quittin.0    Smokeless tobacco: Never Used   Substance Use Topics    Alcohol use: No    Drug use: No        PHYSICAL EXAMINATION:  [ INSTRUCTIONS:  \"[x]\" Indicates a positive item  \"[]\" Indicates a negative item  -- DELETE ALL ITEMS NOT EXAMINED]  [x] Alert  [x] Oriented to person/place/time    [x] No apparent distress  [] Toxic appearing    [] Face flushed appearing [] Sclera clear  [] Lips are cyanotic      [x] Breathing appears normal  [] Appears tachypneic      [] Rash on visible skin    [x] Cranial Nerves II-XII grossly intact    [] Motor grossly intact in visible upper extremities    [] Motor grossly intact in visible lower extremities    [x] Normal Mood  [] Anxious appearing    [] Depressed appearing  [] Confused appearing      [] Poor short term memory  [] Poor long term memory    [] OTHER:      Due to this being a TeleHealth encounter, evaluation of the following organ systems is limited: Vitals/Constitutional/EENT/Resp/CV/GI//MS/Neuro/Skin/Heme-Lymph-Imm. ASSESSMENT/PLAN:   Diagnosis Orders   1. Type 2 diabetes mellitus without complication, without long-term current use of insulin (HCC)  glipiZIDE (GLUCOTROL XL) 10 MG extended release tablet    metFORMIN (GLUCOPHAGE) 500 MG tablet   2. Essential hypertension     3. Chronic bilateral low back pain with bilateral sciatica     4. Moderate episode of recurrent major depressive disorder ([facility] [address]     5. Chronic pain of both knees         Return in about 3 months (around 5/3/2022) for diabetes- level 2.     1. Glucose levels have been running higher. Will increase metformin to BID as well as glipizide. Can further increase at next visit if needed. 2. Blood pressure has been running well per patient report. continue the same medication. 3. 5. Notify me if symptoms worsen. She is currently without insurance but can refer to specialist or get diagnostic testing.      4. Mood is overall stable on current dose of zoloft. Continue the same. Please note this report has been partially produced using speech recognition software and may cause contain errors related to that system including grammar, punctuation and spelling as well as words and phrases that may seem inappropriate. If there are questions or concerns please feel free to contact me to clarify. An  electronic signature was used to authenticate this note. --Piper Foreman, DRE - CNP on 2/3/2022 at 12:12 PM        Pursuant to the emergency declaration under the [facility] waiver authority and the [employer] Act, this Virtual  Visit was conducted, with patient's consent, to reduce the patient's risk of exposure to COVID-19 and provide continuity of care for an established patient. Services were provided through a video synchronous discussion virtually to substitute for in-person clinic visit.

## 2022-03-29 ENCOUNTER — NURSE TRIAGE (OUTPATIENT)
Dept: OTHER | Facility: CLINIC | Age: 67
End: 2022-03-29

## 2022-03-29 ENCOUNTER — TELEPHONE (OUTPATIENT)
Dept: FAMILY MEDICINE CLINIC | Age: 67
End: 2022-03-29

## 2022-03-29 NOTE — TELEPHONE ENCOUNTER
----- Message from [address] sent at 3/29/2022 10:07 AM EDT -----  Subject: Appointment Request    Reason for Call: Immediate Return from RN Triage    QUESTIONS  Type of Appointment? Established Patient  Reason for appointment request? Available appointments did not meet   patient need  Additional Information for Provider? PT returned from [facility]. Dispo to be seen   in next 24hrs. Due to system issues, a/h transfer. Intermittent dizziness   after receiving tetanus shot, ringing in left ear, no pain. Sinus   headache. prefers woman provider is [place] is not available. VV. fatigue x   few days. Please f/u.  ---------------------------------------------------------------------------  --------------  CALL BACK INFO  What is the best way for the office to contact you? OK to leave message on   [employer], OK to respond with electronic message via [employer] portal (only   for patients who have registered [employer] account)  Preferred Call Back Phone Number?  [phone]  ---------------------------------------------------------------------------  --------------  SCRIPT ANSWERS

## 2022-03-29 NOTE — TELEPHONE ENCOUNTER
Received call from Donnell Mg at [facility] with [employer]. Subjective: Caller states \"Dizziness started a couple days after a tetanus shot. Started last Thursday. \"     Current Symptoms: dizziness with vertigo, left ear tinnitus, denies ear pain. Sinus headache. Onset: 5 days ago; gradual, improving    Associated Symptoms: reduced activity    Pain Severity: 2/10; dull, aching; constant    Temperature: Denies      What has been tried: resting,     LMP: NA Pregnant: NA    Recommended disposition: See PCP within 24 Hours. Advised to go to nearest [facility] if unable to be seen. Care advice provided, patient verbalizes understanding; denies any other questions or concerns; instructed to call back for any new or worsening symptoms. Patient voiced concerns with going out in public d/t recent pandemic and requests virtual visit. States will not go anywhere for an appointment. Writer provided the [facility] [employer] and [employer] service line as backup in case patient is unable to get appointment. Patient/Caller agrees with recommended disposition; writer provided warm transfer to [employer] at [facility] for appointment scheduling     Attention Provider: Thank you for allowing me to participate in the care of your patient. The patient was connected to triage in response to information provided to the ECC/PSC. Please do not respond through this encounter as the response is not directed to a shared pool.       Reason for Disposition   [1] MODERATE dizziness (e.g., vertigo; feels very unsteady, interferes with normal activities) AND [2] has NOT been evaluated by physician for this    Protocols used: DIZZINESS - VERTIGO-ADULT-

## 2022-03-31 NOTE — TELEPHONE ENCOUNTER
Can the patient do a brief VV tomorrow morning around 930  Please let me know, I can double book that slot.

## 2022-04-20 DIAGNOSIS — E11.9 TYPE 2 DIABETES MELLITUS WITHOUT COMPLICATION, WITHOUT LONG-TERM CURRENT USE OF INSULIN (HCC): ICD-10-CM

## 2022-04-20 DIAGNOSIS — I10 ESSENTIAL HYPERTENSION: Chronic | ICD-10-CM

## 2022-04-22 DIAGNOSIS — I10 ESSENTIAL HYPERTENSION: Chronic | ICD-10-CM

## 2022-04-22 RX ORDER — METOPROLOL TARTRATE 100 MG/1
TABLET ORAL
Qty: 120 TABLET | Refills: 0 | Status: SHIPPED | OUTPATIENT
Start: 2022-04-22 | End: 2022-08-11 | Stop reason: SDUPTHER

## 2022-04-22 NOTE — TELEPHONE ENCOUNTER
Pharmacy is requesting medication refill.  Please approve or deny this request.    Rx requested:  Requested Prescriptions     Pending Prescriptions Disp Refills    metoprolol (LOPRESSOR) 100 MG tablet 120 tablet 0     Sig: TAKE ONE TABLET BY MOUTH TWO TIMES A DAY         Last Office Visit:   2/3/2022      Next Visit Date:  Future Appointments   Date Time Provider Davin Lan   5/5/2022 10:10 AM DRE Chau - CNP [address]

## 2022-04-25 DIAGNOSIS — E11.9 TYPE 2 DIABETES MELLITUS WITHOUT COMPLICATION, WITHOUT LONG-TERM CURRENT USE OF INSULIN (HCC): ICD-10-CM

## 2022-04-25 DIAGNOSIS — F33.1 MODERATE EPISODE OF RECURRENT MAJOR DEPRESSIVE DISORDER (HCC): ICD-10-CM

## 2022-04-25 RX ORDER — SERTRALINE HYDROCHLORIDE 100 MG/1
100 TABLET, FILM COATED ORAL DAILY
Qty: 180 TABLET | Refills: 1 | Status: SHIPPED | OUTPATIENT
Start: 2022-04-25 | End: 2022-08-11 | Stop reason: SDUPTHER

## 2022-04-25 RX ORDER — HYDROCHLOROTHIAZIDE 12.5 MG/1
CAPSULE, GELATIN COATED ORAL
Qty: 30 CAPSULE | Refills: 5 | Status: SHIPPED | OUTPATIENT
Start: 2022-04-25 | End: 2022-08-11 | Stop reason: SDUPTHER

## 2022-04-25 RX ORDER — GLIPIZIDE 10 MG/1
TABLET, FILM COATED, EXTENDED RELEASE ORAL
Qty: 30 TABLET | Refills: 5 | Status: SHIPPED | OUTPATIENT
Start: 2022-04-25 | End: 2022-08-02

## 2022-04-25 NOTE — TELEPHONE ENCOUNTER
Patient is requesting medication refill.  Please approve or deny this request.    Rx requested:  Requested Prescriptions     Pending Prescriptions Disp Refills    metFORMIN (GLUCOPHAGE) 500 MG tablet 180 tablet 1     Sig: Take 1 tablet by mouth 2 times daily (with meals)    sertraline (ZOLOFT) 100 MG tablet 180 tablet 1     Sig: Take 1 tablet by mouth daily         Last Office Visit:   2/3/2022      Next Visit Date:  Future Appointments   Date Time Provider Davin Lan   5/5/2022 10:10 AM Ashok Brunner, APRN - CNP [facility] [address]

## 2022-04-25 NOTE — TELEPHONE ENCOUNTER
Comments:     Last Office Visit (last PCP visit):   2/3/2022    Next Visit Date:  Future Appointments   Date Time Provider Davin Lan   5/5/2022 10:10 AM Heather Salvador, APRN - CNP [address]       **If hasn't been seen in over a year OR hasn't followed up according to last diabetes/ADHD visit, make appointment for patient before sending refill to provider.     Rx requested:  Requested Prescriptions     Pending Prescriptions Disp Refills    glipiZIDE (GLUCOTROL XL) 10 MG extended release tablet [Pharmacy Med Name: GLIPIZIDE ER 10 MG TABLET] 30 tablet 5     Sig: TAKE 1 TABLET BY MOUTH EVERY DAY    hydroCHLOROthiazide (MICROZIDE) 12.5 MG capsule [Pharmacy Med Name: HYDROCHLOROTHIAZIDE 12.5 MG CP] 30 capsule 5     Sig: TAKE 1 CAPSULE BY MOUTH EVERY DAY IN THE MORNING

## 2022-05-03 ENCOUNTER — COMMUNITY OUTREACH (OUTPATIENT)
Dept: INTERNAL MEDICINE | Age: 67
End: 2022-05-03

## 2022-05-03 NOTE — PROGRESS NOTES
Patient's HM shows they are overdue for Mammogram, Colorectal Screening and Cervical Cancer Screening. [employer] and  files searched without success. No results to attach to order nor HM updated. Note added to upcoming appointment to discuss Care Gap.

## 2022-08-02 DIAGNOSIS — E11.9 TYPE 2 DIABETES MELLITUS WITHOUT COMPLICATION, WITHOUT LONG-TERM CURRENT USE OF INSULIN (HCC): ICD-10-CM

## 2022-08-02 RX ORDER — GLIPIZIDE 10 MG/1
TABLET, FILM COATED, EXTENDED RELEASE ORAL
Qty: 120 TABLET | Refills: 2 | Status: SHIPPED | OUTPATIENT
Start: 2022-08-02 | End: 2022-08-11 | Stop reason: SDUPTHER

## 2022-08-11 DIAGNOSIS — F33.1 MODERATE EPISODE OF RECURRENT MAJOR DEPRESSIVE DISORDER (HCC): ICD-10-CM

## 2022-08-11 DIAGNOSIS — E11.9 TYPE 2 DIABETES MELLITUS WITHOUT COMPLICATION, WITHOUT LONG-TERM CURRENT USE OF INSULIN (HCC): ICD-10-CM

## 2022-08-11 DIAGNOSIS — I10 ESSENTIAL HYPERTENSION: Chronic | ICD-10-CM

## 2022-08-11 RX ORDER — METOPROLOL TARTRATE 100 MG/1
TABLET ORAL
Qty: 120 TABLET | Refills: 0 | Status: SHIPPED | OUTPATIENT
Start: 2022-08-11

## 2022-08-11 RX ORDER — HYDROCHLOROTHIAZIDE 12.5 MG/1
CAPSULE, GELATIN COATED ORAL
Qty: 30 CAPSULE | Refills: 5 | Status: SHIPPED | OUTPATIENT
Start: 2022-08-11

## 2022-08-11 RX ORDER — SERTRALINE HYDROCHLORIDE 100 MG/1
100 TABLET, FILM COATED ORAL DAILY
Qty: 180 TABLET | Refills: 1 | Status: SHIPPED | OUTPATIENT
Start: 2022-08-11

## 2022-08-11 RX ORDER — GLIPIZIDE 10 MG/1
TABLET, FILM COATED, EXTENDED RELEASE ORAL
Qty: 120 TABLET | Refills: 2 | Status: SHIPPED | OUTPATIENT
Start: 2022-08-11

## 2022-08-11 NOTE — TELEPHONE ENCOUNTER
Patient requesting medication refill. Please approve or deny this request. Patient tested positive for Covid. Patient would like something prescribed. Rx requested:  Requested Prescriptions      No prescriptions requested or ordered in this encounter         Last Office Visit:   2/3/2022      Next Visit Date:  No future appointments.

## 2022-12-19 DIAGNOSIS — I10 ESSENTIAL HYPERTENSION: Chronic | ICD-10-CM

## 2022-12-19 DIAGNOSIS — E87.5 HYPERKALEMIA: ICD-10-CM

## 2022-12-19 DIAGNOSIS — Z00.00 ENCOUNTER FOR SCREENING AND PREVENTATIVE CARE: ICD-10-CM

## 2022-12-19 DIAGNOSIS — E78.5 DYSLIPIDEMIA: ICD-10-CM

## 2022-12-19 DIAGNOSIS — E11.9 TYPE 2 DIABETES MELLITUS WITHOUT COMPLICATION, WITHOUT LONG-TERM CURRENT USE OF INSULIN (HCC): Primary | ICD-10-CM

## 2022-12-19 RX ORDER — METOPROLOL TARTRATE 100 MG/1
TABLET ORAL
Qty: 120 TABLET | Refills: 0 | Status: SHIPPED | OUTPATIENT
Start: 2022-12-19

## 2022-12-19 NOTE — TELEPHONE ENCOUNTER
Pt scheduled with new provider, 1/5/2023 but needs meds managed until then. Pt also requested BW ordered before new pt appointment (ordered).

## 2023-01-10 ENCOUNTER — TELEPHONE (OUTPATIENT)
Dept: FAMILY MEDICINE CLINIC | Age: 68
End: 2023-01-10

## 2023-01-10 ENCOUNTER — OFFICE VISIT (OUTPATIENT)
Dept: FAMILY MEDICINE CLINIC | Age: 68
End: 2023-01-10
Payer: MEDICARE

## 2023-01-10 VITALS
BODY MASS INDEX: 32.1 KG/M2 | WEIGHT: 170 LBS | DIASTOLIC BLOOD PRESSURE: 90 MMHG | HEART RATE: 75 BPM | OXYGEN SATURATION: 94 % | SYSTOLIC BLOOD PRESSURE: 142 MMHG | HEIGHT: 61 IN

## 2023-01-10 DIAGNOSIS — Z12.31 ENCOUNTER FOR SCREENING MAMMOGRAM FOR BREAST CANCER: ICD-10-CM

## 2023-01-10 DIAGNOSIS — R06.02 SHORTNESS OF BREATH: ICD-10-CM

## 2023-01-10 DIAGNOSIS — Z13.820 ENCOUNTER FOR SCREENING FOR OSTEOPOROSIS: ICD-10-CM

## 2023-01-10 DIAGNOSIS — E11.9 TYPE 2 DIABETES MELLITUS WITHOUT COMPLICATION, WITHOUT LONG-TERM CURRENT USE OF INSULIN (HCC): Primary | ICD-10-CM

## 2023-01-10 DIAGNOSIS — E78.5 DYSLIPIDEMIA: ICD-10-CM

## 2023-01-10 DIAGNOSIS — J20.9 ACUTE BRONCHITIS, UNSPECIFIED ORGANISM: ICD-10-CM

## 2023-01-10 DIAGNOSIS — H60.543 ECZEMA OF EXTERNAL EAR, BILATERAL: ICD-10-CM

## 2023-01-10 DIAGNOSIS — J45.41 MODERATE PERSISTENT ASTHMA WITH ACUTE EXACERBATION: ICD-10-CM

## 2023-01-10 DIAGNOSIS — Z87.891 PERSONAL HISTORY OF TOBACCO USE: ICD-10-CM

## 2023-01-10 DIAGNOSIS — I10 PRIMARY HYPERTENSION: ICD-10-CM

## 2023-01-10 DIAGNOSIS — I10 ESSENTIAL HYPERTENSION: ICD-10-CM

## 2023-01-10 PROCEDURE — 3077F SYST BP >= 140 MM HG: CPT | Performed by: NURSE PRACTITIONER

## 2023-01-10 PROCEDURE — 1123F ACP DISCUSS/DSCN MKR DOCD: CPT | Performed by: NURSE PRACTITIONER

## 2023-01-10 PROCEDURE — 99204 OFFICE O/P NEW MOD 45 MIN: CPT | Performed by: NURSE PRACTITIONER

## 2023-01-10 PROCEDURE — 3079F DIAST BP 80-89 MM HG: CPT | Performed by: NURSE PRACTITIONER

## 2023-01-10 PROCEDURE — G0296 VISIT TO DETERM LDCT ELIG: HCPCS | Performed by: NURSE PRACTITIONER

## 2023-01-10 RX ORDER — METHYLPREDNISOLONE 4 MG/1
TABLET ORAL
Qty: 21 TABLET | Refills: 0 | Status: SHIPPED | OUTPATIENT
Start: 2023-01-10 | End: 2023-01-16

## 2023-01-10 RX ORDER — ALBUTEROL SULFATE 90 UG/1
2 AEROSOL, METERED RESPIRATORY (INHALATION) 4 TIMES DAILY PRN
Qty: 1 EACH | Refills: 2 | Status: SHIPPED | OUTPATIENT
Start: 2023-01-10

## 2023-01-10 RX ORDER — DOXYCYCLINE HYCLATE 100 MG
100 TABLET ORAL 2 TIMES DAILY
Qty: 20 TABLET | Refills: 0 | Status: SHIPPED | OUTPATIENT
Start: 2023-01-10 | End: 2023-01-20

## 2023-01-10 RX ORDER — BENZONATATE 100 MG/1
100 CAPSULE ORAL 3 TIMES DAILY PRN
Qty: 21 CAPSULE | Refills: 0 | Status: SHIPPED | OUTPATIENT
Start: 2023-01-10 | End: 2023-01-17

## 2023-01-10 RX ORDER — MOMETASONE FUROATE 1 MG/G
OINTMENT TOPICAL
Qty: 45 G | Refills: 0 | Status: SHIPPED | OUTPATIENT
Start: 2023-01-10

## 2023-01-10 ASSESSMENT — ENCOUNTER SYMPTOMS
COLOR CHANGE: 0
EYE PAIN: 0
CONSTIPATION: 0
SORE THROAT: 1
BACK PAIN: 0
COUGH: 1
TROUBLE SWALLOWING: 0
ABDOMINAL PAIN: 0
RHINORRHEA: 1
DIARRHEA: 0
CHEST TIGHTNESS: 0
SHORTNESS OF BREATH: 1

## 2023-01-10 NOTE — PROGRESS NOTES
Subjective  Chief Complaint   Patient presents with    New Patient     Pt present to establish care    Health Maintenance     Pt declined flu vax d/t not feeling well. Pt aware of health maintenance, would like stuff ordered to schedule in the future    Congestion     Pt states she has not been feeling well for a couple of weeks now with nasal and chest congestion. Toe Pain     Pt states she has toe pain & feels a lot of pressure on them        HPI    Pt here to establish care. Previously following with Radha Hassan. Previously on trazodone for insomnia, would like to restart, has not been sleeping well. Uses elocon ointment in her ears for eczema, asking for refill. DM-taking glipizide twice daily, was on metformin but started getting pain in her back when was taking them so she stopped them. Has not been checking sugars at home. Does not have glucometer at home. Cough/congestion-had covid in the summer, fatigue since then, cough started a couple weeks ago, covid test negative at home, also with runny nose (clear), sore throat, shortness of breath when she has sore throat. Pt concerned about pneumonia. Due for labs.     Past Medical History:   Diagnosis Date    Allergic rhinitis     Allergic rhinitis 6/26/2017    Asthma 12/19/2011    Depression     Eczema of external ear 7/3/2017    Fatty infiltration of liver 1/28/2016    Hyperlipidemia 6/28/2017    Hypertension     Instability of ankle joint     Type 2 diabetes mellitus without complication ([facility] [address] 13/52/2881    Type II or unspecified type diabetes mellitus without mention of complication, not stated as uncontrolled      Patient Active Problem List    Diagnosis Date Noted    Dizziness 07/06/2021    Stress reaction 05/17/2018    Chronic right shoulder pain 05/17/2018    Chronic pain of left ankle 01/25/2018    Eczema of external ear 07/03/2017    Hyperlipidemia 06/28/2017    Allergic rhinitis 06/26/2017    Fatty infiltration of liver 01/28/2016 Type 2 diabetes mellitus without complication (HCC) 2013    Hypertension     Instability of ankle joint     Asthma 2011    Depression      Past Surgical History:   Procedure Laterality Date    APPENDECTOMY      BREAST BIOPSY Right 04/17/15    CORE    CHOLECYSTECTOMY      SHOULDER SURGERY Right 2018    Dr. garcia     Family History   Adopted: Yes     Social History     Socioeconomic History    Marital status:      Spouse name: None    Number of children: None    Years of education: None    Highest education level: None   Tobacco Use    Smoking status: Former     Packs/day: 2.00     Years: 30.00     Pack years: 60.00     Types: Cigarettes     Quit date: 2010     Years since quittin.0    Smokeless tobacco: Never   Substance and Sexual Activity    Alcohol use: No    Drug use: No     Social Determinants of Health     Financial Resource Strain: Low Risk     Difficulty of Paying Living Expenses: Not hard at all   Food Insecurity: No Food Insecurity    Worried About Running Out of Food in the Last Year: Never true    Ran Out of Food in the Last Year: Never true     Current Outpatient Medications on File Prior to Visit   Medication Sig Dispense Refill    metoprolol (LOPRESSOR) 100 MG tablet TAKE ONE TABLET BY MOUTH TWO TIMES A  tablet 0    glipiZIDE (GLUCOTROL XL) 10 MG extended release tablet TAKE 1 TABLET BY MOUTH TWICE A  tablet 2    sertraline (ZOLOFT) 100 MG tablet Take 1 tablet by mouth in the morning. 180 tablet 1    hydroCHLOROthiazide (MICROZIDE) 12.5 MG capsule TAKE 1 CAPSULE BY MOUTH EVERY DAY IN THE MORNING 30 capsule 5    cyclobenzaprine (FLEXERIL) 10 MG tablet Take 1 tablet by mouth 2 times daily as needed for Muscle spasms 40 tablet 2    traZODone (DESYREL) 100 MG tablet Take 1 tablet by mouth nightly 30 tablet 2    Lactobacillus (PROBIOTIC ACIDOPHILUS) TABS Take 1 tablet by mouth 2 times daily 60 tablet 0    Cholecalciferol (VITAMIN D3) 5000 UNITS TABS Take  5,000 Units by mouth daily      FISH OIL 1,000 mg by Does not apply route daily. [DISCONTINUED] glipiZIDE (GLUCOTROL XL) 10 MG extended release tablet TAKE ONE TABLET BY MOUTH EVERY DAY 90 tablet 1    [DISCONTINUED] metoprolol (LOPRESSOR) 100 MG tablet TAKE ONE TABLET BY MOUTH TWO TIMES A  tablet 1    [DISCONTINUED] hydroCHLOROthiazide (MICROZIDE) 12.5 MG capsule Take 1 capsule by mouth every morning 90 capsule 1    [DISCONTINUED] metFORMIN (GLUCOPHAGE) 500 MG tablet Take 1 tablet by mouth daily (with breakfast) 90 tablet 1    [DISCONTINUED] sertraline (ZOLOFT) 100 MG tablet Take 1 tablet by mouth daily 180 tablet 1     No current facility-administered medications on file prior to visit. Allergies   Allergen Reactions    Pcn [Penicillins] Shortness Of Breath    Penicillin G Shortness Of Breath    Statins Other (See Comments)     myalgias    Nsaids Nausea And Vomiting and Other (See Comments)       Review of Systems   Constitutional:  Positive for fatigue. Negative for activity change, appetite change, chills, diaphoresis and fever. HENT:  Positive for rhinorrhea and sore throat. Negative for congestion, ear pain, hearing loss and trouble swallowing. Eyes:  Negative for pain and visual disturbance. Respiratory:  Positive for cough and shortness of breath. Negative for chest tightness. Cardiovascular:  Negative for chest pain, palpitations and leg swelling. Gastrointestinal:  Negative for abdominal pain, constipation and diarrhea. Endocrine: Negative for polydipsia, polyphagia and polyuria. Genitourinary:  Negative for difficulty urinating and dysuria. Musculoskeletal:  Negative for arthralgias and back pain. Skin:  Negative for color change and rash. Neurological:  Negative for dizziness and light-headedness. Psychiatric/Behavioral:  Negative for dysphoric mood. The patient is not nervous/anxious.       Objective  Vitals:    01/10/23 1019 01/10/23 1028   BP: (!) 140/88 (!) 142/90   Site: Left Upper Arm    Position: Sitting    Cuff Size: Medium Adult    Pulse: 75    SpO2: 94%    Weight: 170 lb (77.1 kg)    Height: 5' 1\" (1.549 m)      Physical Exam  Vitals reviewed. Constitutional:       General: She is not in acute distress. Appearance: Normal appearance. She is well-developed. She is obese. She is ill-appearing. She is not toxic-appearing or diaphoretic. HENT:      Head: Normocephalic and atraumatic. Right Ear: Hearing, tympanic membrane, ear canal and external ear normal. There is no impacted cerumen. Left Ear: Hearing, tympanic membrane, ear canal and external ear normal. There is no impacted cerumen. Nose: Nose normal. No congestion or rhinorrhea. Mouth/Throat:      Mouth: Mucous membranes are moist.      Pharynx: Oropharynx is clear. No oropharyngeal exudate or posterior oropharyngeal erythema. Eyes:      General:         Right eye: No discharge. Left eye: No discharge. Conjunctiva/sclera: Conjunctivae normal.      Pupils: Pupils are equal, round, and reactive to light. Neck:      Thyroid: No thyromegaly. Cardiovascular:      Rate and Rhythm: Normal rate and regular rhythm. Pulses: Normal pulses. Heart sounds: Normal heart sounds. No murmur heard. Pulmonary:      Effort: Pulmonary effort is normal. No respiratory distress. Breath sounds: No stridor. Examination of the right-upper field reveals decreased breath sounds. Examination of the left-upper field reveals decreased breath sounds. Examination of the right-middle field reveals decreased breath sounds. Examination of the left-middle field reveals decreased breath sounds. Examination of the right-lower field reveals decreased breath sounds. Examination of the left-lower field reveals decreased breath sounds. Decreased breath sounds present. No wheezing, rhonchi or rales. Comments: Increased work of breathing  Chest:      Chest wall: No tenderness. Musculoskeletal:         General: Normal range of motion. Cervical back: Neck supple. No tenderness. Right lower leg: No edema. Left lower leg: No edema. Lymphadenopathy:      Cervical: No cervical adenopathy. Skin:     General: Skin is warm and dry. Capillary Refill: Capillary refill takes less than 2 seconds. Coloration: Skin is not jaundiced or pale. Findings: No bruising, erythema, lesion or rash. Neurological:      General: No focal deficit present. Mental Status: She is alert and oriented to person, place, and time. Mental status is at baseline. Cranial Nerves: No cranial nerve deficit. Coordination: Coordination normal.      Gait: Gait normal.   Psychiatric:         Mood and Affect: Mood normal.         Speech: Speech normal.         Behavior: Behavior normal.         Thought Content: Thought content normal.         Judgment: Judgment normal.       Assessment& Plan     Diagnosis Orders   1. Type 2 diabetes mellitus without complication, without long-term current use of insulin (HCC)  Hemoglobin A1C    Microalbumin / Creatinine Urine Ratio    blood glucose monitor kit and supplies      2. Encounter for screening mammogram for breast cancer  NANCIE DIGITAL SCREEN W OR WO CAD BILATERAL      3. Encounter for screening for osteoporosis        4. Moderate persistent asthma with acute exacerbation  albuterol sulfate HFA (VENTOLIN HFA) 108 (90 Base) MCG/ACT inhaler      5. Personal history of tobacco use  DC VISIT TO DISCUSS LUNG CA SCREEN W LDCT    CT Lung Screen (Annual/Baseline)      6. Eczema of external ear, bilateral  mometasone (ELOCON) 0.1 % ointment      7. Essential hypertension  CBC    Comprehensive Metabolic Panel      8. Dyslipidemia  Lipid Panel      9. Primary hypertension        10. Shortness of breath  methylPREDNISolone (MEDROL DOSEPACK) 4 MG tablet    XR CHEST STANDARD (2 VW)      11.  Acute bronchitis, unspecified organism  doxycycline hyclate (VIBRA-TABS) 100 MG tablet    methylPREDNISolone (MEDROL DOSEPACK) 4 MG tablet    XR CHEST STANDARD (2 VW)    benzonatate (TESSALON) 100 MG capsule        Plan as above. Get labs completed. F/u in 2 weeks to go over labs and further discuss and manage chronic conditions. Side effects, adverse effects of the medication prescribed today, as well as treatment plan/ rationale and result expectations have been discussed with the patient who expresses understanding and desires to proceed. Close follow up to evaluate treatment results and for coordination of care. I have reviewed the patient's medical history in detail and updated the computerized patient record. As always, patient is advised that if symptoms worsen in any way they will proceed to the nearest emergency room. Orders Placed This Encounter   Procedures    CT Lung Screen (Annual/Baseline)     Age: Patient is 74 y.o. Smoking History: Social History    Tobacco Use      Smoking status: Former        Packs/day: 2.00        Years: 30.00        Pack years: 61        Types: Cigarettes        Quit date: 2010        Years since quittin.0      Smokeless tobacco: Never    Alcohol use: No    Drug use: No   Pack years: 61    Date of last lung cancer screening: No previous lung cancer screening exam     Standing Status:   Future     Standing Expiration Date:   7/10/2024     Order Specific Question:   Is there documentation of shared decision making? Answer:   Yes     Order Specific Question:   Is this a low dose CT or a routine CT? Answer:   Low Dose CT [1]     Order Specific Question:   Is this the first (baseline) CT or an annual exam?     Answer:   Baseline [1]     Order Specific Question:   Does the patient show any signs or symptoms of lung cancer? Answer:   No     Order Specific Question:   Smoking Status? Answer:    Former [4]     Order Specific Question:   Date quit smoking? (must be within 15 years)     Answer: 1/1/2010     Order Specific Question:   Smoking packs per day? Answer:   2     Order Specific Question:   Years smoking? Answer:   30    XR CHEST STANDARD (2 VW)     Standing Status:   Future     Number of Occurrences:   1     Standing Expiration Date:   1/10/2024    NANCIE DIGITAL SCREEN W OR WO CAD BILATERAL     Standing Status:   Future     Standing Expiration Date:   1/10/2024     Order Specific Question:   Reason for exam:     Answer:   breast cancer screening    CBC     Standing Status:   Future     Standing Expiration Date:   1/10/2024    Comprehensive Metabolic Panel     Standing Status:   Future     Standing Expiration Date:   1/10/2024    Lipid Panel     Standing Status:   Future     Standing Expiration Date:   1/10/2024     Order Specific Question:   Is Patient Fasting?/# of Hours     Answer:   10    Hemoglobin A1C     Standing Status:   Future     Standing Expiration Date:   1/10/2024    Microalbumin / Creatinine Urine Ratio     Standing Status:   Future     Standing Expiration Date:   1/10/2024    NC VISIT TO DISCUSS LUNG CA SCREEN W LDCT       Orders Placed This Encounter   Medications    albuterol sulfate HFA (VENTOLIN HFA) 108 (90 Base) MCG/ACT inhaler     Sig: Inhale 2 puffs into the lungs 4 times daily as needed for Wheezing     Dispense:  1 each     Refill:  2    mometasone (ELOCON) 0.1 % ointment     Sig: Apply topically daily. Dispense:  45 g     Refill:  0    doxycycline hyclate (VIBRA-TABS) 100 MG tablet     Sig: Take 1 tablet by mouth 2 times daily for 10 days     Dispense:  20 tablet     Refill:  0    methylPREDNISolone (MEDROL DOSEPACK) 4 MG tablet     Sig: Take by mouth.      Dispense:  21 tablet     Refill:  0    benzonatate (TESSALON) 100 MG capsule     Sig: Take 1 capsule by mouth 3 times daily as needed for Cough     Dispense:  21 capsule     Refill:  0    blood glucose monitor kit and supplies     Sig: Dispense sufficient amount for indicated testing frequency plus additional to accommodate PRN testing needs. Dispense all needed supplies to include: monitor, strips, lancing device, lancets, control solutions, alcohol swabs. Dispense:  1 kit     Refill:  0     Brand per patient preference. May round up to next available package size. Medications Discontinued During This Encounter   Medication Reason    ibuprofen (ADVIL;MOTRIN) 800 MG tablet Side effects    fluticasone (FLONASE) 50 MCG/ACT nasal spray Therapy completed    mometasone (ELOCON) 0.1 % ointment REORDER    albuterol sulfate HFA (VENTOLIN HFA) 108 (90 Base) MCG/ACT inhaler REORDER       Return in about 2 weeks (around 1/24/2023) for diabetes follow up. DRE Meng CNP        Discussed with the patient the current USPSTF guidelines released March 9, 2021 for screening for lung cancer. For adults aged 48 to [de-identified] years who have a 20 pack-year smoking history and currently smoke or have quit within the past 15 years the grade B recommendation is to:  Screen for lung cancer with low-dose computed tomography (LDCT) every year. Stop screening once a person has not smoked for 15 years or has a health problem that limits life expectancy or the ability to have lung surgery. The patient  reports that she quit smoking about 13 years ago. Her smoking use included cigarettes. She has a 60.00 pack-year smoking history. She has never used smokeless tobacco.. Discussed with patient the risks and benefits of screening, including over-diagnosis, false positive rate, and total radiation exposure. The patient currently exhibits no signs or symptoms suggestive of lung cancer. Discussed with patient the importance of compliance with yearly annual lung cancer screenings and willingness to undergo diagnosis and treatment if screening scan is positive. In addition, the patient was counseled regarding the importance of remaining smoke free and/or total smoking cessation.     Also reviewed the following if the patient has Medicare that as of February 10, 2022, Medicare only covers LDCT screening in patients aged 51-72 with at least a 20 pack-year smoking history who currently smoke or have quit in the last 15 years

## 2023-01-10 NOTE — PATIENT INSTRUCTIONS

## 2023-01-12 RX ORDER — LANCETS 30 GAUGE
1 EACH MISCELLANEOUS DAILY
Qty: 100 EACH | Refills: 5 | Status: SHIPPED | OUTPATIENT
Start: 2023-01-12

## 2023-01-12 RX ORDER — GLUCOSAMINE HCL/CHONDROITIN SU 500-400 MG
CAPSULE ORAL
Qty: 100 STRIP | Refills: 0 | Status: SHIPPED | OUTPATIENT
Start: 2023-01-12 | End: 2023-01-16 | Stop reason: CLARIF

## 2023-01-27 ENCOUNTER — TELEPHONE (OUTPATIENT)
Dept: FAMILY MEDICINE CLINIC | Age: 68
End: 2023-01-27

## 2023-02-08 DIAGNOSIS — Z79.4 TYPE 2 DIABETES MELLITUS WITHOUT COMPLICATION, WITH LONG-TERM CURRENT USE OF INSULIN (HCC): Primary | ICD-10-CM

## 2023-02-08 DIAGNOSIS — E11.9 TYPE 2 DIABETES MELLITUS WITHOUT COMPLICATION, WITH LONG-TERM CURRENT USE OF INSULIN (HCC): Primary | ICD-10-CM

## 2023-02-08 NOTE — TELEPHONE ENCOUNTER
Received request from [facility] for missing info? The only thing I saw was missing is DX?  Corrected, please sign pending script

## 2023-02-20 ENCOUNTER — HOSPITAL ENCOUNTER (OUTPATIENT)
Dept: CT IMAGING | Age: 68
Discharge: HOME | End: 2023-02-22
Payer: MEDICARE

## 2023-02-20 ENCOUNTER — OFFICE VISIT (OUTPATIENT)
Dept: FAMILY MEDICINE CLINIC | Age: 68
End: 2023-02-20
Payer: MEDICARE

## 2023-02-20 ENCOUNTER — HOSPITAL ENCOUNTER (OUTPATIENT)
Age: 68
Discharge: HOME | End: 2023-02-22

## 2023-02-20 ENCOUNTER — HOSPITAL ENCOUNTER (OUTPATIENT)
Dept: GENERAL RADIOLOGY | Age: 68
Discharge: HOME | End: 2023-02-22
Payer: MEDICARE

## 2023-02-20 VITALS
BODY MASS INDEX: 31.34 KG/M2 | SYSTOLIC BLOOD PRESSURE: 132 MMHG | WEIGHT: 166 LBS | OXYGEN SATURATION: 95 % | DIASTOLIC BLOOD PRESSURE: 78 MMHG | HEART RATE: 75 BPM | HEIGHT: 61 IN | TEMPERATURE: 97.9 F

## 2023-02-20 DIAGNOSIS — S92.352A CLOSED DISPLACED FRACTURE OF FIFTH METATARSAL BONE OF LEFT FOOT, INITIAL ENCOUNTER: ICD-10-CM

## 2023-02-20 DIAGNOSIS — M79.672 ACUTE PAIN OF LEFT FOOT: ICD-10-CM

## 2023-02-20 DIAGNOSIS — M25.472 PAIN AND SWELLING OF LEFT ANKLE: ICD-10-CM

## 2023-02-20 DIAGNOSIS — S99.912A INJURY OF LEFT ANKLE AND FOOT, INITIAL ENCOUNTER: Primary | ICD-10-CM

## 2023-02-20 DIAGNOSIS — M25.572 PAIN AND SWELLING OF LEFT ANKLE: ICD-10-CM

## 2023-02-20 DIAGNOSIS — S93.402A SPRAIN OF LEFT ANKLE, UNSPECIFIED LIGAMENT, INITIAL ENCOUNTER: ICD-10-CM

## 2023-02-20 DIAGNOSIS — Z87.891 PERSONAL HISTORY OF TOBACCO USE: ICD-10-CM

## 2023-02-20 DIAGNOSIS — M79.675 PAIN AND SWELLING OF TOE OF LEFT FOOT: ICD-10-CM

## 2023-02-20 DIAGNOSIS — S99.922A INJURY OF LEFT ANKLE AND FOOT, INITIAL ENCOUNTER: Primary | ICD-10-CM

## 2023-02-20 DIAGNOSIS — M79.89 PAIN AND SWELLING OF TOE OF LEFT FOOT: ICD-10-CM

## 2023-02-20 PROCEDURE — 73630 X-RAY EXAM OF FOOT: CPT

## 2023-02-20 PROCEDURE — 1123F ACP DISCUSS/DSCN MKR DOCD: CPT | Performed by: NURSE PRACTITIONER

## 2023-02-20 PROCEDURE — 99213 OFFICE O/P EST LOW 20 MIN: CPT | Performed by: NURSE PRACTITIONER

## 2023-02-20 PROCEDURE — 71271 CT THORAX LUNG CANCER SCR C-: CPT

## 2023-02-20 PROCEDURE — 3078F DIAST BP <80 MM HG: CPT | Performed by: NURSE PRACTITIONER

## 2023-02-20 PROCEDURE — 3074F SYST BP LT 130 MM HG: CPT | Performed by: NURSE PRACTITIONER

## 2023-02-20 PROCEDURE — 73610 X-RAY EXAM OF ANKLE: CPT

## 2023-02-20 RX ORDER — HYDROCODONE BITARTRATE AND ACETAMINOPHEN 5; 325 MG/1; MG/1
1 TABLET ORAL EVERY 8 HOURS PRN
Qty: 9 TABLET | Refills: 0 | Status: SHIPPED | OUTPATIENT
Start: 2023-02-20 | End: 2023-02-23

## 2023-02-20 SDOH — ECONOMIC STABILITY: HOUSING INSECURITY
IN THE LAST 12 MONTHS, WAS THERE A TIME WHEN YOU DID NOT HAVE A STEADY PLACE TO SLEEP OR SLEPT IN A SHELTER (INCLUDING NOW)?: NO

## 2023-02-20 SDOH — ECONOMIC STABILITY: INCOME INSECURITY: HOW HARD IS IT FOR YOU TO PAY FOR THE VERY BASICS LIKE FOOD, HOUSING, MEDICAL CARE, AND HEATING?: NOT HARD AT ALL

## 2023-02-20 SDOH — ECONOMIC STABILITY: FOOD INSECURITY: WITHIN THE PAST 12 MONTHS, YOU WORRIED THAT YOUR FOOD WOULD RUN OUT BEFORE YOU GOT MONEY TO BUY MORE.: NEVER TRUE

## 2023-02-20 SDOH — ECONOMIC STABILITY: FOOD INSECURITY: WITHIN THE PAST 12 MONTHS, THE FOOD YOU BOUGHT JUST DIDN'T LAST AND YOU DIDN'T HAVE MONEY TO GET MORE.: NEVER TRUE

## 2023-02-20 ASSESSMENT — ENCOUNTER SYMPTOMS: COLOR CHANGE: 1

## 2023-02-20 NOTE — PROGRESS NOTES
Subjective  Suni Ramirez, 74 y.o. female presents today with:  Chief Complaint   Patient presents with    Ankle Injury     X3 nights ago pt states injury happened in the middle of the night pt states walking back to her bed pt states felt like someone bend her ankle. painful to step on        HPI  Presents to [facility] for an injury   She twisted ankle on Thursday   Pain and swelling of left ankle since injury. Denies erythema or bruising. Resulted in bruising and pain of left foot   States ankle causing her more discomfort than her foot. Ankle sore at rest. Foot pain exacerbated with ambulation. She has been elevating the affected area   Didn't ice d/t discomfort of weight of ice on ankle   NSAIDs cause GI issues. She has been taking Tylenol   Pain interrupting sleep   Has sprained ankle prior                   Past Medical History:   Diagnosis Date    Allergic rhinitis     Allergic rhinitis 6/26/2017    Asthma 12/19/2011    Depression     Eczema of external ear 7/3/2017    Fatty infiltration of liver 1/28/2016    Hyperlipidemia 6/28/2017    Hypertension     Instability of ankle joint     Type 2 diabetes mellitus without complication ([facility] [address] 03/76/6519    Type II or unspecified type diabetes mellitus without mention of complication, not stated as uncontrolled       Past Surgical History:   Procedure Laterality Date    APPENDECTOMY      BREAST BIOPSY Right 04/17/15    CORE    CHOLECYSTECTOMY      SHOULDER SURGERY Right 09/26/2018    Dr. Aston Hsu     Family History   Adopted: Yes           Review of Systems   Constitutional:  Positive for activity change. Gastrointestinal:  Negative for nausea. Musculoskeletal:  Positive for arthralgias. Skin:  Positive for color change. Neurological:  Negative for dizziness, light-headedness, numbness and headaches. Psychiatric/Behavioral:  Positive for sleep disturbance. PMH, Surgical Hx, Family Hx, and Social Hx reviewed and updated.             Objective  Vitals: 02/20/23 1219   BP: 132/78   Site: Right Upper Arm   Position: Sitting   Cuff Size: Medium Adult   Pulse: 75   Temp: 97.9 °F (36.6 °C)   TempSrc: Temporal   SpO2: 95%   Weight: 166 lb (75.3 kg)   Height: 5' 1\" (1.549 m)     BP Readings from Last 3 Encounters:   02/20/23 132/78   01/10/23 (!) 142/90   06/11/21 128/70     Wt Readings from Last 3 Encounters:   02/20/23 166 lb (75.3 kg)   01/10/23 170 lb (77.1 kg)   12/02/19 175 lb (79.4 kg)         Physical Exam  Vitals reviewed. Constitutional:       General: She is not in acute distress. Appearance: Normal appearance. HENT:      Right Ear: External ear normal.      Left Ear: External ear normal.      Nose: Nose normal.      Mouth/Throat:      Lips: Pink. Eyes:      General: Lids are normal.      Conjunctiva/sclera: Conjunctivae normal.   Cardiovascular:      Rate and Rhythm: Normal rate. Pulmonary:      Effort: Pulmonary effort is normal.   Musculoskeletal:      Cervical back: Normal range of motion. Left ankle: Swelling present. No ecchymosis. Tenderness present. Decreased range of motion: d/t pain. Left Achilles Tendon: Normal.      Left foot: Normal range of motion and normal capillary refill. Swelling, tenderness and bony tenderness present. No foot drop. Normal pulse. Legs:    Skin:     General: Skin is warm and dry. Capillary Refill: Capillary refill takes less than 2 seconds. Neurological:      General: No focal deficit present. Mental Status: She is alert and oriented to person, place, and time. Assessment & Plan    Diagnosis Orders   1. Injury of left ankle and foot, initial encounter  XR ANKLE LEFT (MIN 3 VIEWS)    [facility] Ankle Brace, Left    XR FOOT LEFT (MIN 3 VIEWS)    HYDROcodone-acetaminophen (NORCO) 5-325 MG per tablet      2. Closed displaced fracture of fifth metatarsal bone of left foot, initial encounter  HYDROcodone-acetaminophen (NORCO) 5-325 MG per tablet      3. Sprain of left ankle, unspecified ligament, initial encounter  [facility] [facility] Ankle Brace, Left    HYDROcodone-acetaminophen (NORCO) 5-325 MG per tablet      4. Pain and swelling of left ankle  XR ANKLE LEFT (MIN 3 VIEWS)    HYDROcodone-acetaminophen (NORCO) 5-325 MG per tablet      5. Acute pain of left foot  XR FOOT LEFT (MIN 3 VIEWS)    HYDROcodone-acetaminophen (NORCO) 5-325 MG per tablet        Orders Placed This Encounter   Procedures    XR ANKLE LEFT (MIN 3 VIEWS)     Standing Status:   Future     Number of Occurrences:   1     Standing Expiration Date:   2/20/2024     Order Specific Question:   Reason for exam:     Answer:   twisted ankle 5 days prior    XR FOOT LEFT (MIN 3 VIEWS)     Standing Status:   Future     Number of Occurrences:   1     Standing Expiration Date:   2/20/2024     Order Specific Question:   Reason for exam:     Answer:   injury 5 days prior    [facility] [facility] Ankle Brace, Left     Order Specific Question:   Equipment:     Answer: Ankle Brace, Left     Orders Placed This Encounter   Medications    HYDROcodone-acetaminophen (NORCO) 5-325 MG per tablet     Sig: Take 1 tablet by mouth every 8 hours as needed for Pain for up to 3 days. Intended supply: 3 days. Take lowest dose possible to manage pain Max Daily Amount: 3 tablets     Dispense:  9 tablet     Refill:  0     Reduce doses taken as pain becomes manageable       Return for appt with Ortho       Reviewed with the patient: current clinical status & medications. Side effects, adverse effects of the medication prescribed today, as well as treatment plan/rationale and result expectations have been discussed with the patient who expressed understanding. Close follow up to evaluate treatment results and for coordination of care. I have reviewed the patient's medical history in detail and updated the computerized patient record.       DRE Wallace - NP

## 2023-02-22 ASSESSMENT — ENCOUNTER SYMPTOMS: NAUSEA: 0

## 2023-02-23 ENCOUNTER — OFFICE VISIT (OUTPATIENT)
Dept: ORTHOPEDIC SURGERY | Age: 68
End: 2023-02-23

## 2023-02-23 DIAGNOSIS — S92.355A NONDISPLACED FRACTURE OF FIFTH METATARSAL BONE, LEFT FOOT, INITIAL ENCOUNTER FOR CLOSED FRACTURE: Primary | ICD-10-CM

## 2023-02-23 ASSESSMENT — ENCOUNTER SYMPTOMS
RESPIRATORY NEGATIVE: 1
GASTROINTESTINAL NEGATIVE: 1
EYES NEGATIVE: 1
ALLERGIC/IMMUNOLOGIC NEGATIVE: 1

## 2023-02-23 NOTE — PROGRESS NOTES
Orthopedic Surgery and Sports Medicine    Subjective:      Patient ID: Peggy Gates is a 74 y.o. female who presents today for:  Chief Complaint   Patient presents with    New Patient     Here for exam for a Fractured LT foot, states she had rolled her ankle while walking back to her bed, from a bathroom break, occurred about 1 week ago, x-rays in Epic       HPI  Jodi Corral is a 14-year-old female who presents today for her left foot injury. She states that she rolled her ankle/foot when she was walking from the bathroom back to bed approximately 1 week ago. She felt pain in the lateral aspect of the foot. She has been ambulating still in a regular shoe. She denies any numbness or tingling in the foot. Previous treatment: None  NSAIDs: No, unable to take them  Physical therapy: No    Workers Compensation:   Have you missed work for this issue? No  Is this issue being addressed under a worker's compensation claim?  No    Past Medical History:   Diagnosis Date    Allergic rhinitis     Allergic rhinitis 6/26/2017    Asthma 12/19/2011    Depression     Eczema of external ear 7/3/2017    Fatty infiltration of liver 1/28/2016    Hyperlipidemia 6/28/2017    Hypertension     Instability of ankle joint     Type 2 diabetes mellitus without complication ([facility] [address] 16/96/8552    Type II or unspecified type diabetes mellitus without mention of complication, not stated as uncontrolled       Past Surgical History:   Procedure Laterality Date    APPENDECTOMY      BREAST BIOPSY Right 04/17/15    CORE    CHOLECYSTECTOMY      SHOULDER SURGERY Right 09/26/2018    Dr. Teresa Corona     Social History     Socioeconomic History    Marital status:      Spouse name: Not on file    Number of children: Not on file    Years of education: Not on file    Highest education level: Not on file   Occupational History    Not on file   Tobacco Use    Smoking status: Former     Packs/day: 2.00     Years: 30.00     Pack years: 60.00     Types: Cigarettes     Quit date: 2010     Years since quittin.1    Smokeless tobacco: Never   Substance and Sexual Activity    Alcohol use: No    Drug use: No    Sexual activity: Not on file   Other Topics Concern    Not on file   Social History Narrative    Not on file     Social Determinants of Health     Financial Resource Strain: Low Risk     Difficulty of Paying Living Expenses: Not hard at all   Food Insecurity: No Food Insecurity    Worried About [address] [employer] in the Last Year: Never true    [address] [employer] in the Last Year: Never true   Transportation Needs: Unknown    Lack of Transportation (Medical): Not on file    Lack of Transportation (Non-Medical): No   Physical Activity: Not on file   Stress: Not on file   Social Connections: Not on file   Intimate Partner Violence: Not on file   Housing Stability: Unknown    Unable to Pay for Housing in the Last Year: Not on file    Number of Places Lived in the Last Year: Not on file    Unstable Housing in the Last Year: No     Family History   Adopted: Yes     Allergies   Allergen Reactions    Pcn [Penicillins] Shortness Of Breath    Penicillin G Shortness Of Breath    Statins Other (See Comments)     myalgias    Nsaids Nausea And Vomiting and Other (See Comments)     Current Outpatient Medications on File Prior to Visit   Medication Sig Dispense Refill    HYDROcodone-acetaminophen (NORCO) 5-325 MG per tablet Take 1 tablet by mouth every 8 hours as needed for Pain for up to 3 days. Intended supply: 3 days. Take lowest dose possible to manage pain Max Daily Amount: 3 tablets 9 tablet 0    blood glucose monitor kit and supplies Dispense sufficient amount for indicated testing frequency plus additional to accommodate PRN testing needs. Dispense all needed supplies to include: monitor, strips, lancing device, lancets, control solutions, alcohol swabs.  1 kit 0    Lancets MISC 1 each by Does not apply route daily 100 each 5    albuterol sulfate HFA (VENTOLIN HFA) 108 (90 Base) MCG/ACT inhaler Inhale 2 puffs into the lungs 4 times daily as needed for Wheezing 1 each 2    mometasone (ELOCON) 0.1 % ointment Apply topically daily. 45 g 0    metoprolol (LOPRESSOR) 100 MG tablet TAKE ONE TABLET BY MOUTH TWO TIMES A  tablet 0    glipiZIDE (GLUCOTROL XL) 10 MG extended release tablet TAKE 1 TABLET BY MOUTH TWICE A  tablet 2    sertraline (ZOLOFT) 100 MG tablet Take 1 tablet by mouth in the morning. 180 tablet 1    hydroCHLOROthiazide (MICROZIDE) 12.5 MG capsule TAKE 1 CAPSULE BY MOUTH EVERY DAY IN THE MORNING 30 capsule 5    cyclobenzaprine (FLEXERIL) 10 MG tablet Take 1 tablet by mouth 2 times daily as needed for Muscle spasms 40 tablet 2    traZODone (DESYREL) 100 MG tablet Take 1 tablet by mouth nightly 30 tablet 2    Lactobacillus (PROBIOTIC ACIDOPHILUS) TABS Take 1 tablet by mouth 2 times daily 60 tablet 0    Cholecalciferol (VITAMIN D3) 5000 UNITS TABS Take 5,000 Units by mouth daily      FISH OIL 1,000 mg by Does not apply route daily. [DISCONTINUED] glipiZIDE (GLUCOTROL XL) 10 MG extended release tablet TAKE ONE TABLET BY MOUTH EVERY DAY 90 tablet 1    [DISCONTINUED] metoprolol (LOPRESSOR) 100 MG tablet TAKE ONE TABLET BY MOUTH TWO TIMES A  tablet 1    [DISCONTINUED] hydroCHLOROthiazide (MICROZIDE) 12.5 MG capsule Take 1 capsule by mouth every morning 90 capsule 1    [DISCONTINUED] metFORMIN (GLUCOPHAGE) 500 MG tablet Take 1 tablet by mouth daily (with breakfast) 90 tablet 1    [DISCONTINUED] sertraline (ZOLOFT) 100 MG tablet Take 1 tablet by mouth daily 180 tablet 1     No current facility-administered medications on file prior to visit. Review of Systems   Constitutional: Negative. HENT: Negative. Eyes: Negative. Respiratory: Negative. Cardiovascular: Negative. Gastrointestinal: Negative. Endocrine: Negative. Genitourinary: Negative. Musculoskeletal:  Positive for arthralgias and joint swelling. Skin: Negative. Allergic/Immunologic: Negative. Neurological: Negative. Hematological: Negative. Psychiatric/Behavioral: Negative. Objective:   LMP 01/01/2007     Physical Exam:  Ortho Exam    Left foot: There is mild swelling and mild bruising along the lateral aspect of the foot. Tenderness to palpation along the fifth metatarsal.  She can flex and extend all toes. Full ankle range of motion. Sensation intact to light touch distally throughout the dorsal and plantar surface of the foot. The foot is warm and well-perfused. Radiographs and Laboratory Studies:     Diagnostic Imaging Studies:    3 view x-ray of the left foot dated 2/20/2023 was reviewed by me and demonstrates a nondisplaced spiral fracture of the distal shaft of the fifth metatarsal.    Laboratory Studies:   Lab Results   Component Value Date    WBC 6.0 11/06/2020    HGB 14.7 11/06/2020    HCT 44.9 11/06/2020    MCV 88.6 11/06/2020     11/06/2020     Lab Results   Component Value Date    SEDRATE 16 05/31/2016     No results found for: CRP    Assessment:      Diagnosis Orders   1. Nondisplaced fracture of fifth metatarsal bone, left foot, initial encounter for closed fracture  Pneuma/vac walk boot pre mp Folres is a 74 y.o. female with a history and exam consistent with left fifth metatarsal fracture. Plan:     I had a discussion with the patient regarding her exam, x-ray findings, and diagnosis. She has a nondisplaced spiral fracture of the fifth metatarsal bone. We discussed treatment for this injury. This includes rest, ice, elevation, and immobilization. She was placed into a short walking boot in clinic today. She can be weightbearing as tolerated in the boot. Recommend that she wear the boot when ambulating for the next 4 weeks. After 4 weeks she may begin transitioning from the boot back to a regular shoe as able.  I discussed with her that she should remove the boot throughout the day when she is resting and elevating to move the ankle to prevent stiffness. She should follow-up with me in 6 weeks for repeat x-ray of the left foot    Orders Placed This Encounter   Procedures    Pneuma/vac walk boot pre ots     Patient was prescribed a Breg Short Jacqueline Walking Boot. The left foot will require stabilization / immobilization from this semi-rigid / rigid orthosis to improve their function. The orthosis will assist in protecting the affected area, provide functional support and facilitate healing. Patient was instructed to progress ambulation weight bearing as tolerated in the device. The patient was educated and fit by a healthcare professional with expert knowledge and specialization in brace application while under the direct supervision of the physician. Verbal and written instructions for the use of and application of this item were provided. They were instructed to contact the office immediately should the brace result in increased pain, decreased sensation, increased swelling or worsening of the condition. Return in about 6 weeks (around 4/6/2023).     King Collier MD

## 2023-02-27 DIAGNOSIS — I10 ESSENTIAL HYPERTENSION: Chronic | ICD-10-CM

## 2023-02-28 RX ORDER — METOPROLOL TARTRATE 100 MG/1
100 TABLET ORAL 2 TIMES DAILY
Qty: 180 TABLET | Refills: 1 | Status: SHIPPED | OUTPATIENT
Start: 2023-02-28

## 2023-03-07 ENCOUNTER — TELEMEDICINE (OUTPATIENT)
Dept: FAMILY MEDICINE CLINIC | Age: 68
End: 2023-03-07
Payer: MEDICARE

## 2023-03-07 DIAGNOSIS — Z78.0 POSTMENOPAUSAL: Primary | ICD-10-CM

## 2023-03-07 DIAGNOSIS — Z12.11 COLON CANCER SCREENING: ICD-10-CM

## 2023-03-07 DIAGNOSIS — R06.02 SHORTNESS OF BREATH: ICD-10-CM

## 2023-03-07 DIAGNOSIS — G89.29 CHRONIC BILATERAL LOW BACK PAIN WITH BILATERAL SCIATICA: ICD-10-CM

## 2023-03-07 DIAGNOSIS — F33.1 MODERATE EPISODE OF RECURRENT MAJOR DEPRESSIVE DISORDER (HCC): ICD-10-CM

## 2023-03-07 DIAGNOSIS — G47.00 INSOMNIA, UNSPECIFIED TYPE: ICD-10-CM

## 2023-03-07 DIAGNOSIS — G89.29 CHRONIC PAIN OF BOTH KNEES: ICD-10-CM

## 2023-03-07 DIAGNOSIS — I10 ESSENTIAL HYPERTENSION: Chronic | ICD-10-CM

## 2023-03-07 DIAGNOSIS — M54.42 CHRONIC BILATERAL LOW BACK PAIN WITH BILATERAL SCIATICA: ICD-10-CM

## 2023-03-07 DIAGNOSIS — R91.8 ABNORMAL CT LUNG SCREENING: ICD-10-CM

## 2023-03-07 DIAGNOSIS — M25.562 CHRONIC PAIN OF BOTH KNEES: ICD-10-CM

## 2023-03-07 DIAGNOSIS — M25.561 CHRONIC PAIN OF BOTH KNEES: ICD-10-CM

## 2023-03-07 DIAGNOSIS — M54.41 CHRONIC BILATERAL LOW BACK PAIN WITH BILATERAL SCIATICA: ICD-10-CM

## 2023-03-07 PROCEDURE — 1123F ACP DISCUSS/DSCN MKR DOCD: CPT | Performed by: NURSE PRACTITIONER

## 2023-03-07 PROCEDURE — 99214 OFFICE O/P EST MOD 30 MIN: CPT | Performed by: NURSE PRACTITIONER

## 2023-03-07 RX ORDER — VENLAFAXINE HYDROCHLORIDE 75 MG/1
75 CAPSULE, EXTENDED RELEASE ORAL DAILY
Qty: 30 CAPSULE | Refills: 3 | Status: SHIPPED | OUTPATIENT
Start: 2023-03-07

## 2023-03-07 RX ORDER — TRAZODONE HYDROCHLORIDE 100 MG/1
100 TABLET ORAL NIGHTLY
Qty: 30 TABLET | Refills: 2 | Status: SHIPPED | OUTPATIENT
Start: 2023-03-07

## 2023-03-07 RX ORDER — HYDROCHLOROTHIAZIDE 12.5 MG/1
CAPSULE, GELATIN COATED ORAL
Qty: 30 CAPSULE | Refills: 2 | Status: SHIPPED | OUTPATIENT
Start: 2023-03-07

## 2023-03-07 ASSESSMENT — PATIENT HEALTH QUESTIONNAIRE - PHQ9
8. MOVING OR SPEAKING SO SLOWLY THAT OTHER PEOPLE COULD HAVE NOTICED. OR THE OPPOSITE, BEING SO FIGETY OR RESTLESS THAT YOU HAVE BEEN MOVING AROUND A LOT MORE THAN USUAL: 0
7. TROUBLE CONCENTRATING ON THINGS, SUCH AS READING THE NEWSPAPER OR WATCHING TELEVISION: 1
10. IF YOU CHECKED OFF ANY PROBLEMS, HOW DIFFICULT HAVE THESE PROBLEMS MADE IT FOR YOU TO DO YOUR WORK, TAKE CARE OF THINGS AT HOME, OR GET ALONG WITH OTHER PEOPLE: 0
9. THOUGHTS THAT YOU WOULD BE BETTER OFF DEAD, OR OF HURTING YOURSELF: 0
SUM OF ALL RESPONSES TO PHQ QUESTIONS 1-9: 13
3. TROUBLE FALLING OR STAYING ASLEEP: 3
SUM OF ALL RESPONSES TO PHQ QUESTIONS 1-9: 13
SUM OF ALL RESPONSES TO PHQ QUESTIONS 1-9: 13
5. POOR APPETITE OR OVEREATING: 0
SUM OF ALL RESPONSES TO PHQ9 QUESTIONS 1 & 2: 3
6. FEELING BAD ABOUT YOURSELF - OR THAT YOU ARE A FAILURE OR HAVE LET YOURSELF OR YOUR FAMILY DOWN: 3
4. FEELING TIRED OR HAVING LITTLE ENERGY: 3
2. FEELING DOWN, DEPRESSED OR HOPELESS: 3
SUM OF ALL RESPONSES TO PHQ QUESTIONS 1-9: 13
1. LITTLE INTEREST OR PLEASURE IN DOING THINGS: 0

## 2023-03-07 ASSESSMENT — ENCOUNTER SYMPTOMS
COUGH: 0
DIARRHEA: 0
COLOR CHANGE: 0
CONSTIPATION: 0
ABDOMINAL PAIN: 0
SHORTNESS OF BREATH: 1
TROUBLE SWALLOWING: 0
BACK PAIN: 0
CHEST TIGHTNESS: 0
EYE PAIN: 0

## 2023-03-07 NOTE — PROGRESS NOTES
3/7/2023    TELEHEALTH EVALUATION -- Audio/Visual (During LOFJP-44 public health emergency)    Due to [place] [address] outbreak, patient's office visit was converted to a virtual visit. Patient was contacted and agreed to proceed with a virtual visit via [email or web address]  The risks and benefits of converting to a virtual visit were discussed in light of the current infectious disease epidemic. Patient also understood that insurance coverage and co-pays are up to their individual insurance plans. HPI:    Rosamaria Dunaway (:  1955) has requested an audio/video evaluation for the following concern(s):    Chief Complaint   Patient presents with    Results     CT lung     Depression     Scored 13. Health Maintenance     Yes for cologuard. Yes for DEXA. DepressionPatient presents with the following symptoms: shortness of breath. Patient is not experiencing: nervousness/anxiety and palpitations. Follow up to discuss CT lung screening results and check up. Fracture her foot since she was seen last. Wearing boot now. Breathing has been doing better since she was seen last time. Had labs ordered at last visit but did not complete yet. Has not been checking glucose at home. Needs new Rx for handicap placard. CT lung screening showed emphysematous changes. Pt smoked for approx 30 years. Depression-having some increased depression. Adult son living with her and having some issues with that. Taking zoloft but feels it may not be helping as much anymore. Was on prozac previously and felt it worked well for her, but did start to notice hallucinations while taking it so she was stopped. Now feels she may be experiencing similar symptoms with the zoloft (seeing spiders that aren't there). Has been on zoloft for approx 20 years now. Review of Systems   Constitutional:  Negative for activity change, appetite change, chills, diaphoresis, fatigue and fever.    HENT:  Negative for congestion, ear pain, hearing loss and trouble swallowing.    Eyes:  Negative for pain and visual disturbance.   Respiratory:  Positive for shortness of breath. Negative for cough and chest tightness.    Cardiovascular:  Negative for chest pain, palpitations and leg swelling.   Gastrointestinal:  Negative for abdominal pain, constipation and diarrhea.   Endocrine: Negative for polydipsia, polyphagia and polyuria.   Genitourinary:  Negative for difficulty urinating and dysuria.   Musculoskeletal:  Negative for arthralgias and back pain.   Skin:  Negative for color change and rash.   Neurological:  Negative for dizziness and light-headedness.   Psychiatric/Behavioral:  Positive for depression and dysphoric mood. The patient is not nervous/anxious.      Prior to Visit Medications    Medication Sig Taking? Authorizing Provider   traZODone (DESYREL) 100 MG tablet Take 1 tablet by mouth nightly Yes DRE Almodovar CNP   hydroCHLOROthiazide (MICROZIDE) 12.5 MG capsule TAKE 1 CAPSULE BY MOUTH EVERY DAY IN THE MORNING Yes DRE Almodovar CNP   Handicap Placard MISC by Does not apply route Yes DRE Almodovar CNP   venlafaxine (EFFEXOR XR) 75 MG extended release capsule Take 1 capsule by mouth daily Yes DRE Almodovar CNP   metoprolol (LOPRESSOR) 100 MG tablet Take 1 tablet by mouth 2 times daily TAKE ONE TABLET BY MOUTH TWO TIMES A DAY Yes DRE Almodovar CNP   blood glucose monitor kit and supplies Dispense sufficient amount for indicated testing frequency plus additional to accommodate PRN testing needs. Dispense all needed supplies to include: monitor, strips, lancing device, lancets, control solutions, alcohol swabs. Yes DRE Almodovar CNP   Lancets MISC 1 each by Does not apply route daily Yes DRE Almodovar CNP   albuterol sulfate HFA (VENTOLIN HFA) 108 (90 Base) MCG/ACT inhaler Inhale 2 puffs into the lungs 4 times daily as needed for Wheezing Yes Carmina Buckley  APRN - CNP   mometasone (ELOCON) 0.1 % ointment Apply topically daily. Yes DRE Giron CNP   glipiZIDE (GLUCOTROL XL) 10 MG extended release tablet TAKE 1 TABLET BY MOUTH TWICE A DAY Yes DRE Dawson CNP   cyclobenzaprine (FLEXERIL) 10 MG tablet Take 1 tablet by mouth 2 times daily as needed for Muscle spasms Yes Dyanna Cranker Riedy, APRN - CNP   Lactobacillus (PROBIOTIC ACIDOPHILUS) TABS Take 1 tablet by mouth 2 times daily Yes Dyanna Cranker Riedy, APRN - CNP   Cholecalciferol (VITAMIN D3) 5000 UNITS TABS Take 5,000 Units by mouth daily Yes Historical Provider, MD   FISH OIL 1,000 mg by Does not apply route daily.  Yes Historical Provider, MD   glipiZIDE (GLUCOTROL XL) 10 MG extended release tablet TAKE ONE TABLET BY MOUTH EVERY DAY  DRE Dawson CNP   metoprolol (LOPRESSOR) 100 MG tablet TAKE ONE TABLET BY MOUTH TWO TIMES A DAY  DRE Dawson CNP   hydroCHLOROthiazide (MICROZIDE) 12.5 MG capsule Take 1 capsule by mouth every morning  Dyanna Cranker Riedy, APRN - CNP   metFORMIN (GLUCOPHAGE) 500 MG tablet Take 1 tablet by mouth daily (with breakfast)  Dyanna Cranker Riedy, APRN - CNP   sertraline (ZOLOFT) 100 MG tablet Take 1 tablet by mouth daily  Dyanna Cranker Riedy, APRN - CNP       Social History     Tobacco Use    Smoking status: Former     Packs/day: 2.00     Years: 30.00     Pack years: 60.00     Types: Cigarettes     Quit date: 2010     Years since quittin.1    Smokeless tobacco: Never   Substance Use Topics    Alcohol use: No    Drug use: No        Allergies   Allergen Reactions    Pcn [Penicillins] Shortness Of Breath    Penicillin G Shortness Of Breath    Statins Other (See Comments)     myalgias    Nsaids Nausea And Vomiting and Other (See Comments)   ,   Past Medical History:   Diagnosis Date    Allergic rhinitis     Allergic rhinitis 2017    Asthma 2011    Depression     Eczema of external ear 7/3/2017    Fatty infiltration of liver 2016 Hyperlipidemia 2017    Hypertension     Instability of ankle joint     Type 2 diabetes mellitus without complication ([facility] [address]     Type II or unspecified type diabetes mellitus without mention of complication, not stated as uncontrolled    ,   Past Surgical History:   Procedure Laterality Date    APPENDECTOMY      BREAST BIOPSY Right 04/17/15    CORE    CHOLECYSTECTOMY      SHOULDER SURGERY Right 2018    Dr. Titus Lombard   ,   Social History     Tobacco Use    Smoking status: Former     Packs/day: 2.00     Years: 30.00     Pack years: 60.00     Types: Cigarettes     Quit date: 2010     Years since quittin.1    Smokeless tobacco: Never   Substance Use Topics    Alcohol use: No    Drug use: No   ,   Family History   Adopted: Yes   ,   Immunization History   Administered Date(s) Administered    COVID-19, MODERNA BLUE border, Primary or Immunocompromised, (age 12y+), IM, 100 mcg/0.5mL 2021, 2021, 2021    Influenza Virus Vaccine 2012    Influenza, FLUARIX, FLULAVAL, FLUZONE (age 10 mo+) AND AFLURIA, (age 1 y+), PF, 0.5mL 10/24/2013    Influenza, FLUBLOK, (age 25 y+), PF, 0.5mL 2021    Pneumococcal Polysaccharide ([id number]) 2007, 2007    Tdap (Boostrix, Adacel) 2010   ,   Health Maintenance   Topic Date Due    HIV screen  Never done    Colorectal Cancer Screen  Never done    Shingles vaccine (1 of 2) Never done    Pneumococcal 65+ years Vaccine (2 - PCV) 2008    DEXA (modify frequency per FRAX score)  Never done    Diabetic retinal exam  02/10/2015    Breast cancer screen  2017    Cervical cancer screen  2017    Diabetic foot exam  2020    Diabetic Alb to Cr ratio (uACR) test  2021    Lipids  2021    GFR test (Diabetes, CKD 3-4, OR last GFR 15-59)  2021    COVID-19 Vaccine (4 - Booster for Moderna series) 2022    A1C test (Diabetic or Prediabetic)  2022    Depression Monitoring  2022    Flu vaccine (1) 08/01/2022    Annual Wellness Visit (AWV)  Never done    Low dose CT lung screening  02/20/2024    DTaP/Tdap/Td vaccine (3 - Td or Tdap) 03/22/2032    Hepatitis C screen  Completed    Hepatitis A vaccine  Aged Out    Hib vaccine  Aged Out    Meningococcal (ACWY) vaccine  Aged Out       PHYSICAL EXAMINATION:  [ INSTRUCTIONS:  \"[x]\" Indicates a positive item  \"[]\" Indicates a negative item  -- DELETE ALL ITEMS NOT EXAMINED]  [x] Alert  [x] Oriented to person/place/time    [x] No apparent distress  [] Toxic appearing    [] Face flushed appearing [x] Sclera clear  [] Lips are cyanotic      [x] Breathing appears normal  [] Appears tachypneic      [] Rash on visible skin    [x] Cranial Nerves II-XII grossly intact    [x] Motor grossly intact in visible upper extremities    [x] Motor grossly intact in visible lower extremities    [x] Normal Mood  [] Anxious appearing    [] Depressed appearing  [] Confused appearing      [] Poor short term memory  [] Poor long term memory    [] OTHER:      Due to this being a TeleHealth encounter, evaluation of the following organ systems is limited: Vitals/Constitutional/EENT/Resp/CV/GI//MS/Neuro/Skin/Heme-Lymph-Imm. ASSESSMENT/PLAN:   Diagnosis Orders   1. Postmenopausal  DEXA BONE DENSITY AXIAL SKELETON      2. Insomnia, unspecified type  traZODone (DESYREL) 100 MG tablet      3. Essential hypertension  hydroCHLOROthiazide (MICROZIDE) 12.5 MG capsule      4. Chronic pain of both knees  Handicap Placard MISC      5. Chronic bilateral low back pain with bilateral sciatica  Handicap Placard MISC      6. Colon cancer screening  Fecal DNA Colorectal cancer screening (Cologuard)      7. Moderate episode of recurrent major depressive disorder (HCC)  venlafaxine (EFFEXOR XR) 75 MG extended release capsule      8. Shortness of breath  Full PFT Study With Bronchodilator      9. Abnormal CT lung screening  Full PFT Study With Bronchodilator        Plan as above. F/u in 1 month. Labs completed prior to appt. Side effects, adverse effects of the medication prescribed today, as well as treatment plan/ rationale and result expectations have been discussed with the patient who expresses understanding and desires to proceed. Close follow up to evaluate treatment results and for coordination of care. I have reviewed the patient's medical history in detail and updated the computerized patient record. As always, patient is advised that if symptoms worsen in any way they will proceed to the nearest emergency room. Return in about 4 weeks (around 4/4/2023) for depression, go over pfts and labs, 30 min. An  electronic signature was used to authenticate this note.     --Wilma Siddiqui, DRE - CNP on 3/7/2023 at 10:47 AM

## 2023-04-05 DIAGNOSIS — E11.9 TYPE 2 DIABETES MELLITUS WITHOUT COMPLICATION, WITHOUT LONG-TERM CURRENT USE OF INSULIN (HCC): ICD-10-CM

## 2023-04-05 DIAGNOSIS — I10 ESSENTIAL HYPERTENSION: Chronic | ICD-10-CM

## 2023-04-05 RX ORDER — GLIPIZIDE 10 MG/1
TABLET, FILM COATED, EXTENDED RELEASE ORAL
Qty: 120 TABLET | Refills: 2 | Status: SHIPPED | OUTPATIENT
Start: 2023-04-05

## 2023-04-05 RX ORDER — HYDROCHLOROTHIAZIDE 12.5 MG/1
CAPSULE, GELATIN COATED ORAL
Qty: 30 CAPSULE | Refills: 2 | Status: SHIPPED | OUTPATIENT
Start: 2023-04-05

## 2023-04-05 NOTE — TELEPHONE ENCOUNTER
Pt very upset said this has been requested and she has been out of this for 2 days now.  Asking for this today

## 2023-04-05 NOTE — TELEPHONE ENCOUNTER
I am approving but pt is overdue for the labs leticia ordered.  Very important that she due them for her safety since she is on medications

## 2023-05-08 ENCOUNTER — TELEPHONE (OUTPATIENT)
Dept: GASTROENTEROLOGY | Age: 68
End: 2023-05-08

## 2023-05-08 ENCOUNTER — TELEPHONE (OUTPATIENT)
Dept: FAMILY MEDICINE CLINIC | Age: 68
End: 2023-05-08

## 2023-05-25 ENCOUNTER — HOSPITAL ENCOUNTER (OUTPATIENT)
Age: 68
Discharge: HOME | End: 2023-05-27
Payer: MEDICARE

## 2023-05-25 ENCOUNTER — OFFICE VISIT (OUTPATIENT)
Dept: ORTHOPEDIC SURGERY | Age: 68
End: 2023-05-25
Payer: MEDICARE

## 2023-05-25 ENCOUNTER — HOSPITAL ENCOUNTER (OUTPATIENT)
Dept: GENERAL RADIOLOGY | Age: 68
Discharge: HOME | End: 2023-05-27
Payer: MEDICARE

## 2023-05-25 VITALS
HEIGHT: 61 IN | WEIGHT: 166 LBS | OXYGEN SATURATION: 98 % | SYSTOLIC BLOOD PRESSURE: 132 MMHG | TEMPERATURE: 97.9 F | HEART RATE: 68 BPM | DIASTOLIC BLOOD PRESSURE: 70 MMHG | BODY MASS INDEX: 31.34 KG/M2

## 2023-05-25 DIAGNOSIS — S92.355A NONDISPLACED FRACTURE OF FIFTH METATARSAL BONE, LEFT FOOT, INITIAL ENCOUNTER FOR CLOSED FRACTURE: Primary | ICD-10-CM

## 2023-05-25 DIAGNOSIS — M54.32 SCIATICA OF LEFT SIDE: ICD-10-CM

## 2023-05-25 DIAGNOSIS — M79.672 LEFT FOOT PAIN: ICD-10-CM

## 2023-05-25 PROCEDURE — G8417 CALC BMI ABV UP PARAM F/U: HCPCS | Performed by: STUDENT IN AN ORGANIZED HEALTH CARE EDUCATION/TRAINING PROGRAM

## 2023-05-25 PROCEDURE — 1090F PRES/ABSN URINE INCON ASSESS: CPT | Performed by: STUDENT IN AN ORGANIZED HEALTH CARE EDUCATION/TRAINING PROGRAM

## 2023-05-25 PROCEDURE — G8427 DOCREV CUR MEDS BY ELIG CLIN: HCPCS | Performed by: STUDENT IN AN ORGANIZED HEALTH CARE EDUCATION/TRAINING PROGRAM

## 2023-05-25 PROCEDURE — 99214 OFFICE O/P EST MOD 30 MIN: CPT | Performed by: STUDENT IN AN ORGANIZED HEALTH CARE EDUCATION/TRAINING PROGRAM

## 2023-05-25 PROCEDURE — 1123F ACP DISCUSS/DSCN MKR DOCD: CPT | Performed by: STUDENT IN AN ORGANIZED HEALTH CARE EDUCATION/TRAINING PROGRAM

## 2023-05-25 PROCEDURE — 73630 X-RAY EXAM OF FOOT: CPT

## 2023-05-25 PROCEDURE — 3078F DIAST BP <80 MM HG: CPT | Performed by: STUDENT IN AN ORGANIZED HEALTH CARE EDUCATION/TRAINING PROGRAM

## 2023-05-25 PROCEDURE — G8400 PT W/DXA NO RESULTS DOC: HCPCS | Performed by: STUDENT IN AN ORGANIZED HEALTH CARE EDUCATION/TRAINING PROGRAM

## 2023-05-25 PROCEDURE — 3075F SYST BP GE 130 - 139MM HG: CPT | Performed by: STUDENT IN AN ORGANIZED HEALTH CARE EDUCATION/TRAINING PROGRAM

## 2023-05-25 PROCEDURE — 1036F TOBACCO NON-USER: CPT | Performed by: STUDENT IN AN ORGANIZED HEALTH CARE EDUCATION/TRAINING PROGRAM

## 2023-05-25 PROCEDURE — 3017F COLORECTAL CA SCREEN DOC REV: CPT | Performed by: STUDENT IN AN ORGANIZED HEALTH CARE EDUCATION/TRAINING PROGRAM

## 2023-05-25 ASSESSMENT — ENCOUNTER SYMPTOMS
RESPIRATORY NEGATIVE: 1
ALLERGIC/IMMUNOLOGIC NEGATIVE: 1
GASTROINTESTINAL NEGATIVE: 1
EYES NEGATIVE: 1

## 2023-05-25 NOTE — PROGRESS NOTES
Orthopedic Surgery and Sports Medicine    Subjective:      Patient ID: Ariadna Osei is a 74 y.o. female who presents today for:  Chief Complaint   Patient presents with    Follow-up     Follow up visit for Nondisplaced fracture of fifth metatarsal bone, left foot 1.5 months       HPI  Ivory Lr is a 71-year-old female who presents today for follow-up evaluation of her left fifth metatarsal fracture. She injured it approximately 3 months ago when she rolled her ankle/foot when she was walking from the bathroom back to bed. She was treated with a short walking boot. She is back to ambulating in a regular shoe. She reports infrequent mild pain along the fifth metatarsal.  She denies any new onset numbness or tingling. Also has a new complaint today of low back pain with left-sided sciatica. This has been present for greater than 6 months. She reports discomfort in the low back and buttock with radiation into the upper posterior leg. She has not had any treatment. She denies any weakness or numbness in the lower extremity.       Past Medical History:   Diagnosis Date    Allergic rhinitis     Allergic rhinitis 6/26/2017    Asthma 12/19/2011    Depression     Eczema of external ear 7/3/2017    Fatty infiltration of liver 1/28/2016    Hyperlipidemia 6/28/2017    Hypertension     Instability of ankle joint     Type 2 diabetes mellitus without complication ([facility] [address] 80/65/8182    Type II or unspecified type diabetes mellitus without mention of complication, not stated as uncontrolled       Past Surgical History:   Procedure Laterality Date    APPENDECTOMY      BREAST BIOPSY Right 04/17/15    CORE    CHOLECYSTECTOMY      SHOULDER SURGERY Right 09/26/2018    Dr. Felipe Villar     Social History     Socioeconomic History    Marital status:      Spouse name: Not on file    Number of children: Not on file    Years of education: Not on file    Highest education level: Not on file   Occupational History    Not on file

## 2023-07-01 DIAGNOSIS — F33.1 MODERATE EPISODE OF RECURRENT MAJOR DEPRESSIVE DISORDER (HCC): ICD-10-CM

## 2023-07-02 DIAGNOSIS — G47.00 INSOMNIA, UNSPECIFIED TYPE: ICD-10-CM

## 2023-07-06 RX ORDER — VENLAFAXINE HYDROCHLORIDE 75 MG/1
CAPSULE, EXTENDED RELEASE ORAL
Qty: 90 CAPSULE | Refills: 0 | Status: SHIPPED | OUTPATIENT
Start: 2023-07-06 | End: 2023-08-01

## 2023-07-06 RX ORDER — TRAZODONE HYDROCHLORIDE 100 MG/1
100 TABLET ORAL NIGHTLY
Qty: 30 TABLET | Refills: 2 | Status: SHIPPED | OUTPATIENT
Start: 2023-07-06

## 2023-07-07 NOTE — TELEPHONE ENCOUNTER
Spoke to pt, says she does not want to schedule an appt due to her walking. Refused mychart visit as well.  Says she may call next week to schedule

## 2023-07-09 DIAGNOSIS — H60.543 ECZEMA OF EXTERNAL EAR, BILATERAL: ICD-10-CM

## 2023-07-11 RX ORDER — MOMETASONE FUROATE 1 MG/G
OINTMENT TOPICAL
Qty: 45 G | Refills: 0 | Status: SHIPPED | OUTPATIENT
Start: 2023-07-11

## 2023-07-17 ENCOUNTER — TELEPHONE (OUTPATIENT)
Dept: FAMILY MEDICINE CLINIC | Age: 68
End: 2023-07-17

## 2023-08-01 ENCOUNTER — OFFICE VISIT (OUTPATIENT)
Dept: FAMILY MEDICINE CLINIC | Age: 68
End: 2023-08-01
Payer: MEDICARE

## 2023-08-01 VITALS
HEIGHT: 61 IN | SYSTOLIC BLOOD PRESSURE: 116 MMHG | OXYGEN SATURATION: 98 % | HEART RATE: 76 BPM | BODY MASS INDEX: 32.1 KG/M2 | WEIGHT: 170 LBS | DIASTOLIC BLOOD PRESSURE: 76 MMHG

## 2023-08-01 DIAGNOSIS — E78.5 DYSLIPIDEMIA: ICD-10-CM

## 2023-08-01 DIAGNOSIS — E11.9 TYPE 2 DIABETES MELLITUS WITHOUT COMPLICATION, WITHOUT LONG-TERM CURRENT USE OF INSULIN (HCC): ICD-10-CM

## 2023-08-01 DIAGNOSIS — Z00.00 MEDICARE WELCOME VISIT: Primary | ICD-10-CM

## 2023-08-01 DIAGNOSIS — G89.29 CHRONIC BILATERAL LOW BACK PAIN WITH BILATERAL SCIATICA: ICD-10-CM

## 2023-08-01 DIAGNOSIS — I10 ESSENTIAL HYPERTENSION: ICD-10-CM

## 2023-08-01 DIAGNOSIS — M25.561 CHRONIC PAIN OF BOTH KNEES: ICD-10-CM

## 2023-08-01 DIAGNOSIS — M54.42 CHRONIC BILATERAL LOW BACK PAIN WITH BILATERAL SCIATICA: ICD-10-CM

## 2023-08-01 DIAGNOSIS — Z00.00 INITIAL MEDICARE ANNUAL WELLNESS VISIT: ICD-10-CM

## 2023-08-01 DIAGNOSIS — Z00.00 ENCOUNTER FOR SCREENING AND PREVENTATIVE CARE: ICD-10-CM

## 2023-08-01 DIAGNOSIS — H66.002 NON-RECURRENT ACUTE SUPPURATIVE OTITIS MEDIA OF LEFT EAR WITHOUT SPONTANEOUS RUPTURE OF TYMPANIC MEMBRANE: ICD-10-CM

## 2023-08-01 DIAGNOSIS — M25.562 CHRONIC PAIN OF BOTH KNEES: ICD-10-CM

## 2023-08-01 DIAGNOSIS — G89.29 CHRONIC PAIN OF BOTH KNEES: ICD-10-CM

## 2023-08-01 DIAGNOSIS — M54.41 CHRONIC BILATERAL LOW BACK PAIN WITH BILATERAL SCIATICA: ICD-10-CM

## 2023-08-01 LAB
ALBUMIN SERPL-MCNC: 4.5 G/DL (ref 3.5–4.6)
ALP SERPL-CCNC: 115 U/L (ref 40–130)
ALT SERPL-CCNC: 41 U/L (ref 0–33)
ANION GAP SERPL CALCULATED.3IONS-SCNC: 15 MEQ/L (ref 9–15)
AST SERPL-CCNC: 34 U/L (ref 0–35)
BASOPHILS # BLD: 0.1 K/UL (ref 0–0.2)
BASOPHILS NFR BLD: 0.7 %
BILIRUB SERPL-MCNC: 0.4 MG/DL (ref 0.2–0.7)
BUN SERPL-MCNC: 29 MG/DL (ref 8–23)
CALCIUM SERPL-MCNC: 9.8 MG/DL (ref 8.5–9.9)
CHLORIDE SERPL-SCNC: 95 MEQ/L (ref 95–107)
CHOLEST SERPL-MCNC: 234 MG/DL (ref 0–199)
CO2 SERPL-SCNC: 25 MEQ/L (ref 20–31)
CREAT SERPL-MCNC: 1 MG/DL (ref 0.5–0.9)
CREAT UR-MCNC: 229.5 MG/DL
EOSINOPHIL # BLD: 0.2 K/UL (ref 0–0.7)
EOSINOPHIL NFR BLD: 3 %
ERYTHROCYTE [DISTWIDTH] IN BLOOD BY AUTOMATED COUNT: 14.9 % (ref 11.5–14.5)
GLOBULIN SER CALC-MCNC: 3.2 G/DL (ref 2.3–3.5)
GLUCOSE SERPL-MCNC: 293 MG/DL (ref 70–99)
HBA1C MFR BLD: 11 % (ref 4.8–5.9)
HCT VFR BLD AUTO: 44 % (ref 37–47)
HDLC SERPL-MCNC: 41 MG/DL (ref 40–59)
HGB BLD-MCNC: 14.9 G/DL (ref 12–16)
LDLC SERPL CALC-MCNC: 154 MG/DL (ref 0–129)
LYMPHOCYTES # BLD: 1 K/UL (ref 1–4.8)
LYMPHOCYTES NFR BLD: 14.1 %
MCH RBC QN AUTO: 29.1 PG (ref 27–31.3)
MCHC RBC AUTO-ENTMCNC: 33.8 % (ref 33–37)
MCV RBC AUTO: 85.9 FL (ref 79.4–94.8)
MICROALBUMIN UR-MCNC: 3.7 MG/DL
MICROALBUMIN/CREAT UR-RTO: 16.1 MG/G (ref 0–30)
MONOCYTES # BLD: 0.6 K/UL (ref 0.2–0.8)
MONOCYTES NFR BLD: 8.4 %
NEUTROPHILS # BLD: 5.5 K/UL (ref 1.4–6.5)
NEUTS SEG NFR BLD: 73.8 %
PLATELET # BLD AUTO: 275 K/UL (ref 130–400)
POTASSIUM SERPL-SCNC: 4.7 MEQ/L (ref 3.4–4.9)
PROT SERPL-MCNC: 7.7 G/DL (ref 6.3–8)
RBC # BLD AUTO: 5.12 M/UL (ref 4.2–5.4)
SODIUM SERPL-SCNC: 135 MEQ/L (ref 135–144)
TRIGL SERPL-MCNC: 197 MG/DL (ref 0–150)
WBC # BLD AUTO: 7.4 K/UL (ref 4.8–10.8)

## 2023-08-01 PROCEDURE — 1123F ACP DISCUSS/DSCN MKR DOCD: CPT | Performed by: NURSE PRACTITIONER

## 2023-08-01 PROCEDURE — G0438 PPPS, INITIAL VISIT: HCPCS | Performed by: NURSE PRACTITIONER

## 2023-08-01 PROCEDURE — 3046F HEMOGLOBIN A1C LEVEL >9.0%: CPT | Performed by: NURSE PRACTITIONER

## 2023-08-01 PROCEDURE — 93000 ELECTROCARDIOGRAM COMPLETE: CPT | Performed by: NURSE PRACTITIONER

## 2023-08-01 PROCEDURE — 3074F SYST BP LT 130 MM HG: CPT | Performed by: NURSE PRACTITIONER

## 2023-08-01 PROCEDURE — 3078F DIAST BP <80 MM HG: CPT | Performed by: NURSE PRACTITIONER

## 2023-08-01 PROCEDURE — 3017F COLORECTAL CA SCREEN DOC REV: CPT | Performed by: NURSE PRACTITIONER

## 2023-08-01 PROCEDURE — 99213 OFFICE O/P EST LOW 20 MIN: CPT | Performed by: NURSE PRACTITIONER

## 2023-08-01 RX ORDER — HYDROCODONE BITARTRATE AND ACETAMINOPHEN 5; 325 MG/1; MG/1
1 TABLET ORAL EVERY 8 HOURS PRN
Qty: 9 TABLET | Refills: 0 | Status: SHIPPED | OUTPATIENT
Start: 2023-08-01 | End: 2023-08-04

## 2023-08-01 RX ORDER — AZITHROMYCIN 250 MG/1
250 TABLET, FILM COATED ORAL SEE ADMIN INSTRUCTIONS
Qty: 6 TABLET | Refills: 0 | Status: SHIPPED | OUTPATIENT
Start: 2023-08-01 | End: 2023-08-06

## 2023-08-01 ASSESSMENT — PATIENT HEALTH QUESTIONNAIRE - PHQ9
1. LITTLE INTEREST OR PLEASURE IN DOING THINGS: 0
7. TROUBLE CONCENTRATING ON THINGS, SUCH AS READING THE NEWSPAPER OR WATCHING TELEVISION: 0
SUM OF ALL RESPONSES TO PHQ QUESTIONS 1-9: 6
SUM OF ALL RESPONSES TO PHQ QUESTIONS 1-9: 0
2. FEELING DOWN, DEPRESSED OR HOPELESS: 0
10. IF YOU CHECKED OFF ANY PROBLEMS, HOW DIFFICULT HAVE THESE PROBLEMS MADE IT FOR YOU TO DO YOUR WORK, TAKE CARE OF THINGS AT HOME, OR GET ALONG WITH OTHER PEOPLE: 0
SUM OF ALL RESPONSES TO PHQ QUESTIONS 1-9: 0
SUM OF ALL RESPONSES TO PHQ QUESTIONS 1-9: 6
5. POOR APPETITE OR OVEREATING: 1
9. THOUGHTS THAT YOU WOULD BE BETTER OFF DEAD, OR OF HURTING YOURSELF: 0
SUM OF ALL RESPONSES TO PHQ9 QUESTIONS 1 & 2: 0
2. FEELING DOWN, DEPRESSED OR HOPELESS: 0
3. TROUBLE FALLING OR STAYING ASLEEP: 2
SUM OF ALL RESPONSES TO PHQ QUESTIONS 1-9: 6
SUM OF ALL RESPONSES TO PHQ QUESTIONS 1-9: 0
SUM OF ALL RESPONSES TO PHQ QUESTIONS 1-9: 0
SUM OF ALL RESPONSES TO PHQ9 QUESTIONS 1 & 2: 0
6. FEELING BAD ABOUT YOURSELF - OR THAT YOU ARE A FAILURE OR HAVE LET YOURSELF OR YOUR FAMILY DOWN: 1
8. MOVING OR SPEAKING SO SLOWLY THAT OTHER PEOPLE COULD HAVE NOTICED. OR THE OPPOSITE, BEING SO FIGETY OR RESTLESS THAT YOU HAVE BEEN MOVING AROUND A LOT MORE THAN USUAL: 0
4. FEELING TIRED OR HAVING LITTLE ENERGY: 2
1. LITTLE INTEREST OR PLEASURE IN DOING THINGS: 0
SUM OF ALL RESPONSES TO PHQ QUESTIONS 1-9: 6

## 2023-08-01 ASSESSMENT — LIFESTYLE VARIABLES
HOW OFTEN DO YOU HAVE A DRINK CONTAINING ALCOHOL: MONTHLY OR LESS
HOW MANY STANDARD DRINKS CONTAINING ALCOHOL DO YOU HAVE ON A TYPICAL DAY: 1 OR 2

## 2023-08-01 NOTE — PROGRESS NOTES
Medicare Annual Wellness Visit    Arti Tilley is here for Medicare AWV (Initial )    Assessment & Plan   Medicare welcome visit  -     EKG 12 lead  Initial Medicare annual wellness visit  Chronic pain of both knees  -     Handicap Placard MISC; Starting Tue 8/1/2023, Disp-1 each, R-0, PrintExp 5 years  -     HYDROcodone-acetaminophen (NORCO) 5-325 MG per tablet; Take 1 tablet by mouth every 8 hours as needed for Pain for up to 3 days. Intended supply: 3 days. Take lowest dose possible to manage pain Max Daily Amount: 3 tablets, Disp-9 tablet, R-0Normal  Chronic bilateral low back pain with bilateral sciatica  -     Handicap Placard MISC; Starting Tue 8/1/2023, Disp-1 each, R-0, PrintExp 5 years  -     HYDROcodone-acetaminophen (NORCO) 5-325 MG per tablet; Take 1 tablet by mouth every 8 hours as needed for Pain for up to 3 days. Intended supply: 3 days. Take lowest dose possible to manage pain Max Daily Amount: 3 tablets, Disp-9 tablet, R-0Normal  Non-recurrent acute suppurative otitis media of left ear without spontaneous rupture of tympanic membrane  -     azithromycin (ZITHROMAX) 250 MG tablet; Take 1 tablet by mouth See Admin Instructions for 5 days 500mg on day 1 followed by 250mg on days 2 - 5, Disp-6 tablet, R-0Normal  Essential hypertension  Type 2 diabetes mellitus without complication, without long-term current use of insulin (HCC)    Pt will complete labs today as ordered previously. Plan as above. F/u in6 months or sooner PRN. Side effects, adverse effects of the medication prescribed today, as well as treatment plan/ rationale and result expectations have been discussed with the patient who expresses understanding and desires to proceed. Close follow up to evaluate treatment results and for coordination of care. I have reviewed the patient's medical history in detail and updated the computerized patient record.     As always, patient is advised that if symptoms worsen in any way they will

## 2023-08-01 NOTE — PATIENT INSTRUCTIONS
[phone] or search [employer] on [address] [place] [address]. You need 7999-6888 mg of calcium and 6684-6140 IU of vitamin D per day. It is possible to meet your calcium requirement with diet alone, but a vitamin D supplement is usually necessary to meet this goal.  When exposed to the sun, use a sunscreen that protects against both UVA and UVB radiation with an SPF of 30 or greater. Reapply every 2 to 3 hours or after sweating, drying off with a towel, or swimming. Always wear a seat belt when traveling in a car. Always wear a helmet when riding a bicycle or motorcycle.

## 2023-08-11 DIAGNOSIS — E11.9 TYPE 2 DIABETES MELLITUS WITHOUT COMPLICATION, UNSPECIFIED WHETHER LONG TERM INSULIN USE (HCC): Primary | ICD-10-CM

## 2023-08-30 DIAGNOSIS — E11.65 TYPE 2 DIABETES MELLITUS WITH HYPERGLYCEMIA, WITHOUT LONG-TERM CURRENT USE OF INSULIN (HCC): Primary | ICD-10-CM

## 2023-08-31 DIAGNOSIS — I10 ESSENTIAL HYPERTENSION: Chronic | ICD-10-CM

## 2023-09-04 RX ORDER — METOPROLOL TARTRATE 100 MG/1
100 TABLET ORAL 2 TIMES DAILY
Qty: 180 TABLET | Refills: 1 | Status: SHIPPED | OUTPATIENT
Start: 2023-09-04

## 2023-09-21 ENCOUNTER — TELEPHONE (OUTPATIENT)
Dept: FAMILY MEDICINE CLINIC | Age: 68
End: 2023-09-21

## 2023-09-24 DIAGNOSIS — E11.9 TYPE 2 DIABETES MELLITUS WITHOUT COMPLICATION, WITHOUT LONG-TERM CURRENT USE OF INSULIN (HCC): ICD-10-CM

## 2023-09-24 NOTE — TELEPHONE ENCOUNTER
Comments:     Last Office Visit (last PCP visit):   Visit date not found    Next Visit Date:  Future Appointments   Date Time Provider [address]   2/1/2024 10:45 AM DRE Reed CNP [facility] AT [place]       **If hasn't been seen in over a year OR hasn't followed up according to last diabetes/ADHD visit, make appointment for patient before sending refill to provider.     Rx requested:  Requested Prescriptions     Pending Prescriptions Disp Refills    glipiZIDE (GLUCOTROL XL) 10 MG extended release tablet [Pharmacy Med Name: GLIPIZIDE ER 10 MG TABLET] 120 tablet 2     Sig: TAKE 1 TABLET BY MOUTH TWICE A DAY

## 2023-09-25 RX ORDER — GLIPIZIDE 10 MG/1
TABLET, FILM COATED, EXTENDED RELEASE ORAL
Qty: 120 TABLET | Refills: 2 | Status: SHIPPED | OUTPATIENT
Start: 2023-09-25

## 2023-10-09 DIAGNOSIS — I10 ESSENTIAL HYPERTENSION: Chronic | ICD-10-CM

## 2023-10-10 RX ORDER — HYDROCHLOROTHIAZIDE 12.5 MG/1
CAPSULE, GELATIN COATED ORAL
Qty: 30 CAPSULE | Refills: 2 | Status: SHIPPED | OUTPATIENT
Start: 2023-10-10

## 2023-10-10 NOTE — TELEPHONE ENCOUNTER
Last Office Visit (last PCP visit):   Visit date not found    Next Visit Date:  Future Appointments   Date Time Provider [address]   2/1/2024 10:45 AM DRE Reed CNP [facility] AT [place]       **If hasn't been seen in over a year OR hasn't followed up according to last diabetes/ADHD visit, make appointment for patient before sending refill to provider.     Rx requested:  Requested Prescriptions     Pending Prescriptions Disp Refills    hydroCHLOROthiazide (MICROZIDE) 12.5 MG capsule [Pharmacy Med Name: HYDROCHLOROTHIAZIDE 12.5 MG CP] 30 capsule 2     Sig: TAKE 1 CAPSULE BY MOUTH EVERY DAY IN THE MORNING

## 2023-10-24 ENCOUNTER — TELEPHONE (OUTPATIENT)
Dept: FAMILY MEDICINE CLINIC | Age: 68
End: 2023-10-24

## 2023-11-08 ENCOUNTER — TELEPHONE (OUTPATIENT)
Dept: FAMILY MEDICINE CLINIC | Age: 68
End: 2023-11-08

## 2023-11-30 ENCOUNTER — TELEPHONE (OUTPATIENT)
Dept: FAMILY MEDICINE CLINIC | Age: 68
End: 2023-11-30

## 2023-11-30 ENCOUNTER — TELEMEDICINE (OUTPATIENT)
Dept: FAMILY MEDICINE CLINIC | Age: 68
End: 2023-11-30
Payer: MEDICARE

## 2023-11-30 DIAGNOSIS — B96.89 SINUSITIS, BACTERIAL: Primary | ICD-10-CM

## 2023-11-30 DIAGNOSIS — J02.9 ACUTE PHARYNGITIS, UNSPECIFIED ETIOLOGY: ICD-10-CM

## 2023-11-30 DIAGNOSIS — F33.1 MODERATE EPISODE OF RECURRENT MAJOR DEPRESSIVE DISORDER (HCC): ICD-10-CM

## 2023-11-30 DIAGNOSIS — J32.9 SINUSITIS, BACTERIAL: Primary | ICD-10-CM

## 2023-11-30 PROCEDURE — 1123F ACP DISCUSS/DSCN MKR DOCD: CPT | Performed by: FAMILY MEDICINE

## 2023-11-30 PROCEDURE — G8428 CUR MEDS NOT DOCUMENT: HCPCS | Performed by: FAMILY MEDICINE

## 2023-11-30 PROCEDURE — G8417 CALC BMI ABV UP PARAM F/U: HCPCS | Performed by: FAMILY MEDICINE

## 2023-11-30 PROCEDURE — 99213 OFFICE O/P EST LOW 20 MIN: CPT | Performed by: FAMILY MEDICINE

## 2023-11-30 PROCEDURE — 1036F TOBACCO NON-USER: CPT | Performed by: FAMILY MEDICINE

## 2023-11-30 PROCEDURE — G8484 FLU IMMUNIZE NO ADMIN: HCPCS | Performed by: FAMILY MEDICINE

## 2023-11-30 PROCEDURE — G8400 PT W/DXA NO RESULTS DOC: HCPCS | Performed by: FAMILY MEDICINE

## 2023-11-30 PROCEDURE — 1090F PRES/ABSN URINE INCON ASSESS: CPT | Performed by: FAMILY MEDICINE

## 2023-11-30 PROCEDURE — 3017F COLORECTAL CA SCREEN DOC REV: CPT | Performed by: FAMILY MEDICINE

## 2023-11-30 RX ORDER — BENZONATATE 100 MG/1
100 CAPSULE ORAL 3 TIMES DAILY PRN
Qty: 30 CAPSULE | Refills: 0 | Status: SHIPPED | OUTPATIENT
Start: 2023-11-30 | End: 2023-12-10

## 2023-11-30 RX ORDER — AZITHROMYCIN 250 MG/1
250 TABLET, FILM COATED ORAL SEE ADMIN INSTRUCTIONS
Qty: 6 TABLET | Refills: 0 | Status: SHIPPED | OUTPATIENT
Start: 2023-11-30 | End: 2023-12-05

## 2023-11-30 RX ORDER — ECHINACEA PURPUREA EXTRACT 125 MG
1 TABLET ORAL PRN
Qty: 1 EACH | Refills: 3 | Status: SHIPPED | OUTPATIENT
Start: 2023-11-30

## 2023-11-30 RX ORDER — VENLAFAXINE HYDROCHLORIDE 75 MG/1
75 CAPSULE, EXTENDED RELEASE ORAL
COMMUNITY
Start: 2023-03-07

## 2023-11-30 ASSESSMENT — ENCOUNTER SYMPTOMS
EYE REDNESS: 0
DIARRHEA: 0
COUGH: 1
RHINORRHEA: 1
NAUSEA: 0
ABDOMINAL PAIN: 0
SORE THROAT: 0
VOMITING: 0
SHORTNESS OF BREATH: 0
EYE DISCHARGE: 0

## 2023-11-30 NOTE — TELEPHONE ENCOUNTER
Comments: patient had a VV with  11/30/2023 she is requesting a refill on her Zoloft.    Please advise     Last Office Visit (last PCP visit):   8/1/2023    Next Visit Date:  02/01/2024        **If hasn't been seen in over a year OR hasn't followed up according to last diabetes/ADHD visit, make appointment for patient before sending refill to provider.    Rx requested:  Requested Prescriptions      No prescriptions requested or ordered in this encounter

## 2023-11-30 NOTE — PROGRESS NOTES
Josafat Larkin, was evaluated through a synchronous (real-time) audio-video encounter. The patient (or guardian if applicable) is aware that this is a billable service, which includes applicable co-pays. This Virtual Visit was conducted with patient's (and/or legal guardian's) consent. Patient identification was verified, and a caregiver was present when appropriate. The patient was located at Home: [address] [place] [address]  Provider was located at [facility] ([address] [place], [place] [address]t): [address] [facility], [address] [facility],  [address] [facility]      Josafat Larkin (:  1955) is a Established patient, presenting virtually for evaluation of the following:    Assessment & Plan   Below is the assessment and plan developed based on review of pertinent history, physical exam, labs, studies, and medications. 1. Sinusitis, bacterial  -     sodium chloride (ALTAMIST SPRAY) 0.65 % nasal spray; 1 spray by Nasal route as needed for Congestion, Disp-1 each, R-3Normal  -     benzonatate (TESSALON) 100 MG capsule; Take 1 capsule by mouth 3 times daily as needed for Cough, Disp-30 capsule, R-0Normal  -     azithromycin (ZITHROMAX) 250 MG tablet; Take 1 tablet by mouth See Admin Instructions for 5 days 500mg on day 1 followed by 250mg on days 2 - 5, Disp-6 tablet, R-0Normal  2. Acute pharyngitis, unspecified etiology    -Patient was advised to let me know if there is no improvement in the next 7 to 10 days. Discussed red flags warranting decision to go to the emergency department and patient understood. No follow-ups on file. Subjective   71-year-old female presents virtually with cough, ear pain, sinus pressure and pain, fatigue that has been worsening over the last few days. Patient states she does not have any shortness of breath or difficulty breathing at this time. She has had a cough which is nonproductive. Patient has penicillin allergy. URI   Associated symptoms include congestion, coughing and rhinorrhea.

## 2023-12-04 NOTE — TELEPHONE ENCOUNTER
Patient calling back to say that she has not been on Effexor for a whole year she has been taking her Zoloft and the last refill she had was for the Zoloft, an no she is not feeling any better since her ideo visit last week but it has not been 5 days yet so she will keep doing what is was advised to do at last visit.

## 2023-12-05 RX ORDER — SERTRALINE HYDROCHLORIDE 100 MG/1
100 TABLET, FILM COATED ORAL DAILY
Qty: 90 TABLET | Refills: 1 | Status: SHIPPED | OUTPATIENT
Start: 2023-12-05

## 2023-12-29 DIAGNOSIS — I10 ESSENTIAL HYPERTENSION: Chronic | ICD-10-CM

## 2024-01-02 RX ORDER — HYDROCHLOROTHIAZIDE 12.5 MG/1
CAPSULE, GELATIN COATED ORAL
Qty: 90 CAPSULE | Refills: 1 | Status: SHIPPED | OUTPATIENT
Start: 2024-01-02

## 2024-01-02 NOTE — TELEPHONE ENCOUNTER
Comments:     Last Office Visit (last PCP visit):   8/1/2023    Next Visit Date:  Future Appointments   Date Time Provider Department Center   2/1/2024 10:45 AM Carmina Buckley, APRN - CNP [facility]       **If hasn't been seen in over a year OR hasn't followed up according to last diabetes/ADHD visit, make appointment for patient before sending refill to provider.    Rx requested:  Requested Prescriptions     Pending Prescriptions Disp Refills    hydroCHLOROthiazide (MICROZIDE) 12.5 MG capsule [Pharmacy Med Name: HYDROCHLOROTHIAZIDE 12.5 MG CP] 90 capsule      Sig: TAKE 1 CAPSULE BY MOUTH EVERY DAY IN THE MORNING

## 2024-01-23 ENCOUNTER — TELEPHONE (OUTPATIENT)
Dept: FAMILY MEDICINE CLINIC | Age: 69
End: 2024-01-23

## 2024-02-02 ENCOUNTER — ENROLLMENT (OUTPATIENT)
Dept: PHARMACY | Facility: CLINIC | Age: 69
End: 2024-02-02

## 2024-03-07 DIAGNOSIS — I10 ESSENTIAL HYPERTENSION: Chronic | ICD-10-CM

## 2024-03-07 NOTE — TELEPHONE ENCOUNTER
Comments: appt tomorrow 3/8/24    Last Office Visit (last PCP visit):   8/1/2023    Next Visit Date:  No future appointments.    **If hasn't been seen in over a year OR hasn't followed up according to last diabetes/ADHD visit, make appointment for patient before sending refill to provider.    Rx requested:  Requested Prescriptions     Pending Prescriptions Disp Refills    metoprolol (LOPRESSOR) 100 MG tablet [Pharmacy Med Name: METOPROLOL TARTRATE 100 MG TAB] 180 tablet 1     Sig: TAKE 1 TABLET BY MOUTH TWICE A DAY

## 2024-03-08 RX ORDER — METOPROLOL TARTRATE 100 MG/1
100 TABLET ORAL 2 TIMES DAILY
Qty: 180 TABLET | Refills: 1 | Status: SHIPPED | OUTPATIENT
Start: 2024-03-08

## 2024-03-25 DIAGNOSIS — E11.9 TYPE 2 DIABETES MELLITUS WITHOUT COMPLICATION, WITHOUT LONG-TERM CURRENT USE OF INSULIN (HCC): ICD-10-CM

## 2024-03-25 RX ORDER — GLIPIZIDE 10 MG/1
TABLET, FILM COATED, EXTENDED RELEASE ORAL
Qty: 180 TABLET | Refills: 1 | Status: SHIPPED | OUTPATIENT
Start: 2024-03-25

## 2024-03-25 NOTE — TELEPHONE ENCOUNTER
Comments: can you please fill?    Last Office Visit (last PCP visit):   8/1/2023    Next Visit Date:  Future Appointments   Date Time Provider Department Center   4/18/2024 10:45 AM Carmina Buckley, APRN - CNP [facility]       **If hasn't been seen in over a year OR hasn't followed up according to last diabetes/ADHD visit, make appointment for patient before sending refill to provider.    Rx requested:  Requested Prescriptions     Pending Prescriptions Disp Refills    glipiZIDE (GLUCOTROL XL) 10 MG extended release tablet [Pharmacy Med Name: GLIPIZIDE ER 10 MG TABLET] 180 tablet 1     Sig: TAKE 1 TABLET BY MOUTH TWICE A DAY

## 2024-04-16 DIAGNOSIS — J45.41 MODERATE PERSISTENT ASTHMA WITH ACUTE EXACERBATION: ICD-10-CM

## 2024-04-17 ENCOUNTER — TELEPHONE (OUTPATIENT)
Dept: PHARMACY | Facility: CLINIC | Age: 69
End: 2024-04-17

## 2024-04-17 RX ORDER — ALBUTEROL SULFATE 90 UG/1
2 AEROSOL, METERED RESPIRATORY (INHALATION) 4 TIMES DAILY PRN
Qty: 6.7 EACH | Refills: 2 | Status: SHIPPED | OUTPATIENT
Start: 2024-04-17

## 2024-04-17 NOTE — TELEPHONE ENCOUNTER
Comments: Please review, thanks    Last Office Visit (last PCP visit):   8/1/2023    Next Visit Date:  Future Appointments   Date Time Provider Department Center   4/18/2024 10:45 AM Carmina Buckley, APRN - CNP [facility]       **If hasn't been seen in over a year OR hasn't followed up according to last diabetes/ADHD visit, make appointment for patient before sending refill to provider.    Rx requested:  Requested Prescriptions     Pending Prescriptions Disp Refills    albuterol sulfate HFA (PROVENTIL;VENTOLIN;PROAIR) 108 (90 Base) MCG/ACT inhaler [Pharmacy Med Name: ALBUTEROL HFA (PROVENTIL) INH] 6.7 each 2     Sig: INHALE 2 PUFFS INTO THE LUNGS 4 TIMES DAILY AS NEEDED FOR WHEEZING.

## 2024-05-01 NOTE — TELEPHONE ENCOUNTER
[facility] CLINICAL PHARMACY REVIEW: STATIN THERAPY    Will continue to follow as patient no-showed last appt.     Anmol Hutton, PharmD, BCACP, BCGP  Population Health Pharmacist   [facility] Clinical Pharmacy  Department, toll free: [phone], option 1    =======================================================    For Pharmacy Admin Tracking Only  Program: [facility] [place]  CPA in place:  No  Recommendation Provided To: Provider: 1 via Note to Provider  Intervention Accepted By: Provider: 1  Gap Closed?: No   Time Spent (min): 30    
Pt no showed appt.  
[facility] Clinical Pharmacy  Department, toll free: [phone], option 1

## 2024-05-16 ENCOUNTER — TELEPHONE (OUTPATIENT)
Dept: FAMILY MEDICINE CLINIC | Age: 69
End: 2024-05-16

## 2024-06-08 DIAGNOSIS — F33.1 MODERATE EPISODE OF RECURRENT MAJOR DEPRESSIVE DISORDER (HCC): ICD-10-CM

## 2024-06-10 NOTE — TELEPHONE ENCOUNTER
Comments: LMTCB regarding appt.     Last Office Visit (last PCP visit):   8/1/2023    Next Visit Date:  No future appointments.    **If hasn't been seen in over a year OR hasn't followed up according to last diabetes/ADHD visit, make appointment for patient before sending refill to provider.    Rx requested:  Requested Prescriptions     Pending Prescriptions Disp Refills    sertraline (ZOLOFT) 100 MG tablet [Pharmacy Med Name: SERTRALINE  MG TABLET] 90 tablet 1     Sig: TAKE 1 TABLET BY MOUTH EVERY DAY

## 2024-06-12 RX ORDER — SERTRALINE HYDROCHLORIDE 100 MG/1
100 TABLET, FILM COATED ORAL DAILY
Qty: 90 TABLET | Refills: 1 | Status: SHIPPED | OUTPATIENT
Start: 2024-06-12

## 2024-07-11 DIAGNOSIS — I10 ESSENTIAL HYPERTENSION: Chronic | ICD-10-CM

## 2024-07-11 NOTE — TELEPHONE ENCOUNTER
Comments: lmtcb and schedule appt.    Last Office Visit (last PCP visit):   8/1/2023    Next Visit Date:  No future appointments.    **If hasn't been seen in over a year OR hasn't followed up according to last diabetes/ADHD visit, make appointment for patient before sending refill to provider.    Rx requested:  Requested Prescriptions     Pending Prescriptions Disp Refills    hydroCHLOROthiazide 12.5 MG capsule [Pharmacy Med Name: HYDROCHLOROTHIAZIDE 12.5 MG CP] 90 capsule 1     Sig: TAKE 1 CAPSULE BY MOUTH EVERY DAY IN THE MORNING

## 2024-07-12 RX ORDER — HYDROCHLOROTHIAZIDE 12.5 MG/1
CAPSULE, GELATIN COATED ORAL
Qty: 90 CAPSULE | Refills: 0 | Status: SHIPPED | OUTPATIENT
Start: 2024-07-12

## 2024-07-15 DIAGNOSIS — J45.41 MODERATE PERSISTENT ASTHMA WITH ACUTE EXACERBATION: ICD-10-CM

## 2024-07-15 NOTE — TELEPHONE ENCOUNTER
Comments:     Last Office Visit (last PCP visit):   8/1/2023    Next Visit Date:  Future Appointments   Date Time Provider Department Center   7/25/2024 11:30 AM Carmina Buckley, APRN - CNP [facility]       **If hasn't been seen in over a year OR hasn't followed up according to last diabetes/ADHD visit, make appointment for patient before sending refill to provider.    Rx requested:  Requested Prescriptions     Pending Prescriptions Disp Refills    albuterol sulfate HFA (PROVENTIL;VENTOLIN;PROAIR) 108 (90 Base) MCG/ACT inhaler 6.7 each 2     Sig: Inhale 2 puffs into the lungs 4 times daily as needed for Wheezing

## 2024-07-16 RX ORDER — ALBUTEROL SULFATE 90 UG/1
2 AEROSOL, METERED RESPIRATORY (INHALATION) 4 TIMES DAILY PRN
Qty: 6.7 EACH | Refills: 2 | Status: SHIPPED | OUTPATIENT
Start: 2024-07-16

## 2024-07-25 ENCOUNTER — OFFICE VISIT (OUTPATIENT)
Dept: FAMILY MEDICINE CLINIC | Age: 69
End: 2024-07-25

## 2024-07-25 VITALS
WEIGHT: 173 LBS | OXYGEN SATURATION: 95 % | BODY MASS INDEX: 32.66 KG/M2 | SYSTOLIC BLOOD PRESSURE: 134 MMHG | DIASTOLIC BLOOD PRESSURE: 78 MMHG | HEIGHT: 61 IN | HEART RATE: 82 BPM

## 2024-07-25 DIAGNOSIS — Z78.0 POST-MENOPAUSAL: ICD-10-CM

## 2024-07-25 DIAGNOSIS — M25.562 CHRONIC PAIN OF LEFT KNEE: ICD-10-CM

## 2024-07-25 DIAGNOSIS — M25.562 CHRONIC PAIN OF BOTH KNEES: ICD-10-CM

## 2024-07-25 DIAGNOSIS — Z13.220 LIPID SCREENING: ICD-10-CM

## 2024-07-25 DIAGNOSIS — G89.29 CHRONIC PAIN OF LEFT ANKLE: ICD-10-CM

## 2024-07-25 DIAGNOSIS — G89.29 CHRONIC PAIN OF BOTH KNEES: ICD-10-CM

## 2024-07-25 DIAGNOSIS — F33.1 MODERATE EPISODE OF RECURRENT MAJOR DEPRESSIVE DISORDER (HCC): ICD-10-CM

## 2024-07-25 DIAGNOSIS — I10 ESSENTIAL HYPERTENSION: ICD-10-CM

## 2024-07-25 DIAGNOSIS — E11.65 TYPE 2 DIABETES MELLITUS WITH HYPERGLYCEMIA, WITHOUT LONG-TERM CURRENT USE OF INSULIN (HCC): ICD-10-CM

## 2024-07-25 DIAGNOSIS — G89.29 CHRONIC LEFT-SIDED LOW BACK PAIN WITH LEFT-SIDED SCIATICA: ICD-10-CM

## 2024-07-25 DIAGNOSIS — Z12.31 SCREENING MAMMOGRAM FOR BREAST CANCER: ICD-10-CM

## 2024-07-25 DIAGNOSIS — Z12.11 COLON CANCER SCREENING: ICD-10-CM

## 2024-07-25 DIAGNOSIS — H66.002 NON-RECURRENT ACUTE SUPPURATIVE OTITIS MEDIA OF LEFT EAR WITHOUT SPONTANEOUS RUPTURE OF TYMPANIC MEMBRANE: ICD-10-CM

## 2024-07-25 DIAGNOSIS — G89.29 CHRONIC PAIN OF LEFT KNEE: ICD-10-CM

## 2024-07-25 DIAGNOSIS — G89.29 CHRONIC BILATERAL LOW BACK PAIN WITH BILATERAL SCIATICA: ICD-10-CM

## 2024-07-25 DIAGNOSIS — M25.572 CHRONIC PAIN OF LEFT ANKLE: ICD-10-CM

## 2024-07-25 DIAGNOSIS — M54.42 CHRONIC LEFT-SIDED LOW BACK PAIN WITH LEFT-SIDED SCIATICA: ICD-10-CM

## 2024-07-25 DIAGNOSIS — M54.42 CHRONIC BILATERAL LOW BACK PAIN WITH BILATERAL SCIATICA: ICD-10-CM

## 2024-07-25 DIAGNOSIS — E11.9 TYPE 2 DIABETES MELLITUS WITHOUT COMPLICATION, WITHOUT LONG-TERM CURRENT USE OF INSULIN (HCC): Primary | ICD-10-CM

## 2024-07-25 DIAGNOSIS — M54.41 CHRONIC BILATERAL LOW BACK PAIN WITH BILATERAL SCIATICA: ICD-10-CM

## 2024-07-25 DIAGNOSIS — M25.561 CHRONIC PAIN OF BOTH KNEES: ICD-10-CM

## 2024-07-25 LAB
ALBUMIN SERPL-MCNC: 4.5 G/DL (ref 3.5–4.6)
ALP SERPL-CCNC: 91 U/L (ref 40–130)
ALT SERPL-CCNC: 20 U/L (ref 0–33)
ANION GAP SERPL CALCULATED.3IONS-SCNC: 11 MEQ/L (ref 9–15)
AST SERPL-CCNC: 19 U/L (ref 0–35)
BILIRUB SERPL-MCNC: 0.4 MG/DL (ref 0.2–0.7)
BUN SERPL-MCNC: 29 MG/DL (ref 8–23)
CALCIUM SERPL-MCNC: 10 MG/DL (ref 8.5–9.9)
CHLORIDE SERPL-SCNC: 99 MEQ/L (ref 95–107)
CHOLEST SERPL-MCNC: 247 MG/DL (ref 0–199)
CO2 SERPL-SCNC: 28 MEQ/L (ref 20–31)
CREAT SERPL-MCNC: 0.97 MG/DL (ref 0.5–0.9)
ERYTHROCYTE [DISTWIDTH] IN BLOOD BY AUTOMATED COUNT: 14.5 % (ref 11.5–14.5)
GLOBULIN SER CALC-MCNC: 3.1 G/DL (ref 2.3–3.5)
GLUCOSE SERPL-MCNC: 205 MG/DL (ref 70–99)
HCT VFR BLD AUTO: 44.7 % (ref 37–47)
HDLC SERPL-MCNC: 44 MG/DL (ref 40–59)
HGB BLD-MCNC: 14.9 G/DL (ref 12–16)
LDLC SERPL CALC-MCNC: 165 MG/DL (ref 0–129)
MCH RBC QN AUTO: 28.9 PG (ref 27–31.3)
MCHC RBC AUTO-ENTMCNC: 33.3 % (ref 33–37)
MCV RBC AUTO: 86.6 FL (ref 79.4–94.8)
PLATELET # BLD AUTO: 239 K/UL (ref 130–400)
POTASSIUM SERPL-SCNC: 5.2 MEQ/L (ref 3.4–4.9)
PROT SERPL-MCNC: 7.6 G/DL (ref 6.3–8)
RBC # BLD AUTO: 5.16 M/UL (ref 4.2–5.4)
SODIUM SERPL-SCNC: 138 MEQ/L (ref 135–144)
TRIGL SERPL-MCNC: 192 MG/DL (ref 0–150)
WBC # BLD AUTO: 7.5 K/UL (ref 4.8–10.8)

## 2024-07-25 RX ORDER — ACYCLOVIR 400 MG/1
1 TABLET ORAL DAILY
Qty: 1 EACH | Refills: 0 | Status: SHIPPED | OUTPATIENT
Start: 2024-07-25

## 2024-07-25 RX ORDER — DOXYCYCLINE HYCLATE 100 MG
100 TABLET ORAL 2 TIMES DAILY
Qty: 14 TABLET | Refills: 0 | Status: SHIPPED | OUTPATIENT
Start: 2024-07-25 | End: 2024-08-01

## 2024-07-25 RX ORDER — ACYCLOVIR 400 MG/1
TABLET ORAL
Qty: 1 EACH | Refills: 5 | Status: SHIPPED | OUTPATIENT
Start: 2024-07-25

## 2024-07-25 RX ORDER — HYDROCODONE BITARTRATE AND ACETAMINOPHEN 5; 325 MG/1; MG/1
1 TABLET ORAL EVERY 8 HOURS PRN
Qty: 9 TABLET | Refills: 0 | Status: SHIPPED | OUTPATIENT
Start: 2024-07-25 | End: 2024-07-28

## 2024-07-25 ASSESSMENT — ENCOUNTER SYMPTOMS
EYE PAIN: 0
DIARRHEA: 0
COUGH: 0
CHEST TIGHTNESS: 0
SHORTNESS OF BREATH: 0
COLOR CHANGE: 0
ABDOMINAL PAIN: 0
CONSTIPATION: 0
TROUBLE SWALLOWING: 0
BACK PAIN: 1

## 2024-07-25 NOTE — PROGRESS NOTES
ankle  XR ANKLE LEFT (MIN 3 VIEWS)    Ambulatory referral to Orthopedic Surgery      9. Chronic pain of left knee  XR KNEE LEFT (3 VIEWS)    Ambulatory referral to Orthopedic Surgery      10. Chronic left-sided low back pain with left-sided sciatica  XR LUMBAR SPINE (MIN 4 VIEWS)    Ambulatory referral to Physical Therapy      11. Chronic pain of both knees  HYDROcodone-acetaminophen (NORCO) 5-325 MG per tablet      12. Chronic bilateral low back pain with bilateral sciatica  HYDROcodone-acetaminophen (NORCO) 5-325 MG per tablet      13. Non-recurrent acute suppurative otitis media of left ear without spontaneous rupture of tympanic membrane  doxycycline hyclate (VIBRA-TABS) 100 MG tablet      14. Post-menopausal  DEXA BONE DENSITY AXIAL SKELETON        Plan as above.  Continue current regimen.  Check labs, nancie, bone density, cologuard.  Referrals and xrays as ordered.  Will f/u after testing.  Side effects, adverse effects of the medication prescribed today, as well as treatment plan/ rationale and result expectations have been discussed with the patient who expresses understanding and desires to proceed.    Close follow up to evaluate treatment results and for coordination of care.  I have reviewed the patient's medical history in detail and updated the computerized patient record.    As always, patient is advised that if symptoms worsen in any way they will proceed to the nearest emergency room.       Orders Placed This Encounter   Procedures    Fecal DNA Colorectal cancer screening (Cologuard)    DEXA BONE DENSITY AXIAL SKELETON     Standing Status:   Future     Standing Expiration Date:   7/25/2025    NANCIE DIGITAL SCREEN W OR WO CAD BILATERAL     Standing Status:   Future     Standing Expiration Date:   9/24/2025     Order Specific Question:   Reason for exam:     Answer:   screening    XR ANKLE LEFT (MIN 3 VIEWS)     Standing Status:   Future     Standing Expiration Date:   7/25/2025     Order Specific

## 2024-07-26 LAB
ESTIMATED AVERAGE GLUCOSE: 226 MG/DL
HBA1C MFR BLD: 9.5 % (ref 4–6)

## 2024-07-30 ENCOUNTER — ENROLLMENT (OUTPATIENT)
Dept: PHARMACY | Facility: CLINIC | Age: 69
End: 2024-07-30

## 2024-08-01 DIAGNOSIS — E11.65 TYPE 2 DIABETES MELLITUS WITH HYPERGLYCEMIA, WITHOUT LONG-TERM CURRENT USE OF INSULIN (HCC): Primary | ICD-10-CM

## 2024-08-13 ENCOUNTER — TELEPHONE (OUTPATIENT)
Dept: FAMILY MEDICINE CLINIC | Age: 69
End: 2024-08-13

## 2024-08-13 DIAGNOSIS — M25.572 CHRONIC PAIN OF LEFT ANKLE: Primary | ICD-10-CM

## 2024-08-13 DIAGNOSIS — G89.29 CHRONIC PAIN OF LEFT ANKLE: Primary | ICD-10-CM

## 2024-08-13 DIAGNOSIS — G89.29 CHRONIC PAIN OF LEFT KNEE: ICD-10-CM

## 2024-08-13 DIAGNOSIS — M25.562 CHRONIC PAIN OF LEFT KNEE: ICD-10-CM

## 2024-08-13 NOTE — TELEPHONE ENCOUNTER
Pt states she was a few minutes late to her ortho appt, sat for 20 minutes and was told she was not able to be seen. Pt asking for a new ortho referral not here at the [place] office. Pt was seeing Dr. Kim and says she will not be coming back. Please advise. thanks

## 2024-08-16 ENCOUNTER — OFFICE VISIT (OUTPATIENT)
Dept: ORTHOPEDIC SURGERY | Age: 69
End: 2024-08-16
Payer: MEDICARE

## 2024-08-16 VITALS
HEART RATE: 85 BPM | BODY MASS INDEX: 33.38 KG/M2 | HEIGHT: 60 IN | OXYGEN SATURATION: 92 % | TEMPERATURE: 97.8 F | WEIGHT: 170 LBS

## 2024-08-16 DIAGNOSIS — G89.29 CHRONIC PAIN OF LEFT ANKLE: Primary | ICD-10-CM

## 2024-08-16 DIAGNOSIS — M17.12 PRIMARY OSTEOARTHRITIS OF LEFT KNEE: ICD-10-CM

## 2024-08-16 DIAGNOSIS — M25.572 CHRONIC PAIN OF LEFT ANKLE: Primary | ICD-10-CM

## 2024-08-16 DIAGNOSIS — Z12.31 SCREENING MAMMOGRAM FOR BREAST CANCER: ICD-10-CM

## 2024-08-16 PROCEDURE — 1123F ACP DISCUSS/DSCN MKR DOCD: CPT | Performed by: PHYSICIAN ASSISTANT

## 2024-08-16 PROCEDURE — 20610 DRAIN/INJ JOINT/BURSA W/O US: CPT | Performed by: PHYSICIAN ASSISTANT

## 2024-08-16 PROCEDURE — 99214 OFFICE O/P EST MOD 30 MIN: CPT | Performed by: PHYSICIAN ASSISTANT

## 2024-08-16 RX ORDER — TRIAMCINOLONE ACETONIDE 40 MG/ML
80 INJECTION, SUSPENSION INTRA-ARTICULAR; INTRAMUSCULAR ONCE
Status: COMPLETED | OUTPATIENT
Start: 2024-08-16 | End: 2024-08-16

## 2024-08-16 RX ORDER — LIDOCAINE HYDROCHLORIDE 10 MG/ML
8 INJECTION, SOLUTION INFILTRATION; PERINEURAL ONCE
Status: COMPLETED | OUTPATIENT
Start: 2024-08-16 | End: 2024-08-16

## 2024-08-16 RX ADMIN — LIDOCAINE HYDROCHLORIDE 8 ML: 10 INJECTION, SOLUTION INFILTRATION; PERINEURAL at 15:50

## 2024-08-16 RX ADMIN — TRIAMCINOLONE ACETONIDE 80 MG: 40 INJECTION, SUSPENSION INTRA-ARTICULAR; INTRAMUSCULAR at 15:48

## 2024-08-16 ASSESSMENT — ENCOUNTER SYMPTOMS
GASTROINTESTINAL NEGATIVE: 1
RESPIRATORY NEGATIVE: 1
EYES NEGATIVE: 1

## 2024-08-16 NOTE — PROGRESS NOTES
Ana Paula House (:  1955) is a 68 y.o. female,Established patient, here for evaluation of the following chief complaint(s):  Ankle Pain (Left ankle pain. Pain is currently 3/10. Present for approximately 20 years. )         Assessment & Plan  Screening mammogram for breast cancer   Asymptomatic, continue current medications    Orders:    NANCIE DIGITAL SCREEN W OR WO CAD BILATERAL    Chronic pain of left ankle   Uncontrolled, continue current medications and lifestyle modifications recommended         Primary osteoarthritis of left knee   Uncontrolled, continue current medications, lifestyle modifications recommended, and cortisone injection today    Orders:    DRAIN/INJECT LARGE JOINT/BURSA    lidocaine 1 % injection 8 mL    triamcinolone acetonide (KENALOG-40) injection 80 mg      No follow-ups on file.       Subjective   This is a 68-year-old female complaining of left ankle and left knee pain.  She had a left ankle injury at work greater than 20 years ago.  She has had chronic left ankle pain.  She had a left foot fracture earlier in the year that resolved.  She is also complaining of left knee pain.  She states her knee feels unstable at times.  States it feels stiff and sore at times.  She denies change in sensation of the left leg.  She denies any new lower back pain.  She is retired but does some side work making [employer].        Review of Systems   Constitutional: Negative.    HENT: Negative.     Eyes: Negative.    Respiratory: Negative.     Gastrointestinal: Negative.    Genitourinary: Negative.    Musculoskeletal: Negative.    Psychiatric/Behavioral: Negative.            Objective   Physical Exam  Musculoskeletal:      Comments: Left ankle-little swelling over the lateral malleolus.  Decreased dorsiflexion and plantarflexion range of motion.  Pain with inversion greater than eversion of the left foot at the ankle.  Little crepitus with range of motion.  Medial malleolus is nontender.  Achilles is

## 2024-08-23 ENCOUNTER — OFFICE VISIT (OUTPATIENT)
Dept: ORTHOPEDIC SURGERY | Age: 69
End: 2024-08-23

## 2024-08-23 VITALS
WEIGHT: 165 LBS | TEMPERATURE: 97.6 F | BODY MASS INDEX: 32.39 KG/M2 | OXYGEN SATURATION: 95 % | HEIGHT: 60 IN | HEART RATE: 76 BPM

## 2024-08-23 DIAGNOSIS — G89.29 CHRONIC PAIN OF LEFT ANKLE: Primary | ICD-10-CM

## 2024-08-23 DIAGNOSIS — M25.572 CHRONIC PAIN OF LEFT ANKLE: Primary | ICD-10-CM

## 2024-08-23 DIAGNOSIS — M19.072 ARTHRITIS OF LEFT ANKLE: ICD-10-CM

## 2024-08-23 DIAGNOSIS — M17.12 PRIMARY OSTEOARTHRITIS OF LEFT KNEE: ICD-10-CM

## 2024-08-23 RX ORDER — LIDOCAINE HYDROCHLORIDE 10 MG/ML
1 INJECTION, SOLUTION INFILTRATION; PERINEURAL ONCE
Status: COMPLETED | OUTPATIENT
Start: 2024-08-23 | End: 2024-08-23

## 2024-08-23 RX ORDER — TRIAMCINOLONE ACETONIDE 40 MG/ML
40 INJECTION, SUSPENSION INTRA-ARTICULAR; INTRAMUSCULAR ONCE
Status: COMPLETED | OUTPATIENT
Start: 2024-08-23 | End: 2024-08-23

## 2024-08-23 RX ADMIN — LIDOCAINE HYDROCHLORIDE 1 ML: 10 INJECTION, SOLUTION INFILTRATION; PERINEURAL at 15:21

## 2024-08-23 RX ADMIN — TRIAMCINOLONE ACETONIDE 40 MG: 40 INJECTION, SUSPENSION INTRA-ARTICULAR; INTRAMUSCULAR at 15:22

## 2024-08-23 ASSESSMENT — ENCOUNTER SYMPTOMS
EYES NEGATIVE: 1
GASTROINTESTINAL NEGATIVE: 1
RESPIRATORY NEGATIVE: 1

## 2024-08-23 NOTE — PROGRESS NOTES
Ana Paula House (:  1955) is a 68 y.o. female,Established patient, here for evaluation of the following chief complaint(s):  Follow-up (Left ankle pain. Pain is currently 5/10)         Assessment & Plan      No follow-ups on file.       Subjective   This is 68-year-old female following up for complaint of left knee and left ankle pain.  Injected her left knee with cortisone on her last visit.  States her knee is doing better.  She has ankle arthritis secondary to an injury years ago.  We had discussed injecting the ankle with cortisone today.  She would like to proceed with cortisone injection of the left ankle.  She denies any new injury.        Review of Systems   Constitutional: Negative.    HENT: Negative.     Eyes: Negative.    Respiratory: Negative.     Gastrointestinal: Negative.    Genitourinary: Negative.    Musculoskeletal: Negative.    Psychiatric/Behavioral: Negative.            Objective   Physical Exam  Constitutional:       Appearance: Normal appearance.   HENT:      Head: Normocephalic and atraumatic.      Mouth/Throat:      Mouth: Mucous membranes are moist.   Eyes:      Extraocular Movements: Extraocular movements intact.   Musculoskeletal:      Cervical back: Normal range of motion.      Comments: Left ankle-little swelling over the lateral malleolus.  Decreased dorsiflexion and plantarflexion range of motion.  Pain with inversion greater than eversion of the left foot at the ankle.  Little crepitus with range of motion.  Medial malleolus is nontender.  Achilles is nontender.  Patient curls her toes without difficulty.  Sensation is intact distally to light touch    Left knee-Little joint effusion, small popliteal cyst.  No warmth, erythema, fluctuance or sign of infection.  Full extension, flexion to 120 degrees about 5 degrees less than the right.  Tenderness with palpation at the medial femoral condyle.  The knee joint is stable, there is no laxity with valgus or varus stress.

## 2024-08-26 ENCOUNTER — HOSPITAL ENCOUNTER (OUTPATIENT)
Dept: PHYSICAL THERAPY | Age: 69
Setting detail: THERAPIES SERIES
Discharge: HOME | End: 2024-08-26

## 2024-09-08 DIAGNOSIS — I10 ESSENTIAL HYPERTENSION: Chronic | ICD-10-CM

## 2024-09-10 RX ORDER — METOPROLOL TARTRATE 100 MG
100 TABLET ORAL 2 TIMES DAILY
Qty: 180 TABLET | Refills: 1 | Status: SHIPPED | OUTPATIENT
Start: 2024-09-10

## 2024-09-13 DIAGNOSIS — E11.9 TYPE 2 DIABETES MELLITUS WITHOUT COMPLICATION, WITHOUT LONG-TERM CURRENT USE OF INSULIN (HCC): ICD-10-CM

## 2024-09-16 RX ORDER — GLIPIZIDE 10 MG/1
TABLET, FILM COATED, EXTENDED RELEASE ORAL
Qty: 180 TABLET | Refills: 1 | Status: SHIPPED | OUTPATIENT
Start: 2024-09-16

## 2024-09-26 ENCOUNTER — TELEMEDICINE (OUTPATIENT)
Dept: FAMILY MEDICINE CLINIC | Age: 69
End: 2024-09-26

## 2024-09-26 DIAGNOSIS — Z00.00 MEDICARE ANNUAL WELLNESS VISIT, SUBSEQUENT: Primary | ICD-10-CM

## 2024-09-26 ASSESSMENT — PATIENT HEALTH QUESTIONNAIRE - PHQ9
SUM OF ALL RESPONSES TO PHQ9 QUESTIONS 1 & 2: 1
4. FEELING TIRED OR HAVING LITTLE ENERGY: SEVERAL DAYS
8. MOVING OR SPEAKING SO SLOWLY THAT OTHER PEOPLE COULD HAVE NOTICED. OR THE OPPOSITE, BEING SO FIGETY OR RESTLESS THAT YOU HAVE BEEN MOVING AROUND A LOT MORE THAN USUAL: NOT AT ALL
SUM OF ALL RESPONSES TO PHQ QUESTIONS 1-9: 6
10. IF YOU CHECKED OFF ANY PROBLEMS, HOW DIFFICULT HAVE THESE PROBLEMS MADE IT FOR YOU TO DO YOUR WORK, TAKE CARE OF THINGS AT HOME, OR GET ALONG WITH OTHER PEOPLE: SOMEWHAT DIFFICULT
1. LITTLE INTEREST OR PLEASURE IN DOING THINGS: SEVERAL DAYS
SUM OF ALL RESPONSES TO PHQ QUESTIONS 1-9: 6
6. FEELING BAD ABOUT YOURSELF - OR THAT YOU ARE A FAILURE OR HAVE LET YOURSELF OR YOUR FAMILY DOWN: SEVERAL DAYS
3. TROUBLE FALLING OR STAYING ASLEEP: SEVERAL DAYS
SUM OF ALL RESPONSES TO PHQ QUESTIONS 1-9: 6
7. TROUBLE CONCENTRATING ON THINGS, SUCH AS READING THE NEWSPAPER OR WATCHING TELEVISION: SEVERAL DAYS
9. THOUGHTS THAT YOU WOULD BE BETTER OFF DEAD, OR OF HURTING YOURSELF: NOT AT ALL
2. FEELING DOWN, DEPRESSED OR HOPELESS: NOT AT ALL
SUM OF ALL RESPONSES TO PHQ QUESTIONS 1-9: 6
5. POOR APPETITE OR OVEREATING: SEVERAL DAYS

## 2024-09-26 ASSESSMENT — LIFESTYLE VARIABLES
HOW OFTEN DO YOU HAVE A DRINK CONTAINING ALCOHOL: NEVER
HOW MANY STANDARD DRINKS CONTAINING ALCOHOL DO YOU HAVE ON A TYPICAL DAY: PATIENT DOES NOT DRINK

## 2024-09-27 ENCOUNTER — TELEPHONE (OUTPATIENT)
Dept: PHARMACY | Facility: CLINIC | Age: 69
End: 2024-09-27

## 2024-10-03 NOTE — TELEPHONE ENCOUNTER
Appears patient did not attend 9/30 endo OV    =======================================================   For Pharmacy Admin Tracking Only    Program: [facility]  CPA in place:  No  Gap Closed?: No   Time Spent (min): 15   
score:      Age: 68 years      Sex: Female      Is Non- : No      Diabetic: Yes      Tobacco smoker: No      Systolic Blood Pressure: 134 mmHg      Is BP treated: Yes      HDL Cholesterol: 44 mg/dL      Total Cholesterol: 247 mg/dL     PLAN  Per insurer report, LIS-1 - may be able to receive up to a 100-day supply for the same cost as a 30-day supply.    The following are interventions that have been identified:   Statin Gap (Diabetes): patient may be excluded with a CMS eligible dx code if entered into a billable office visit as a visit diagnosis - muscle side effects with statin therapy (including atorvastatin 10mg three times weekly)

## 2024-10-13 DIAGNOSIS — I10 ESSENTIAL HYPERTENSION: Chronic | ICD-10-CM

## 2024-10-15 NOTE — TELEPHONE ENCOUNTER
Comments: lm for f/u appt     Last Office Visit (last PCP visit):   7/25/2024    Next Visit Date:  No future appointments.    **If hasn't been seen in over a year OR hasn't followed up according to last diabetes/ADHD visit, make appointment for patient before sending refill to provider.    Rx requested:  Requested Prescriptions     Pending Prescriptions Disp Refills    hydroCHLOROthiazide 12.5 MG capsule [Pharmacy Med Name: HYDROCHLOROTHIAZIDE 12.5 MG CP] 90 capsule 0     Sig: TAKE 1 CAPSULE BY MOUTH EVERY DAY IN THE MORNING

## 2024-10-16 RX ORDER — HYDROCHLOROTHIAZIDE 12.5 MG/1
CAPSULE ORAL
Qty: 90 CAPSULE | Refills: 0 | Status: SHIPPED | OUTPATIENT
Start: 2024-10-16

## 2024-11-25 ENCOUNTER — TELEPHONE (OUTPATIENT)
Dept: PHARMACY | Facility: CLINIC | Age: 69
End: 2024-11-25

## 2024-11-25 NOTE — TELEPHONE ENCOUNTER
Carmina Buckley, APRN - CNP, from below, appointment with you 11/26/24 (appears may be virtual/acute visit)   - identified with a care gap for diabetes without a statin  Per patient chart review: Patient has a listed allergy to statins (muscle side effects with statin therapy, including atorvastatin 10mg three times weekly). Unfortunately, allergies are not billed and a CMS approved dx code must be entered on a billable office visit in order for this patient to be excluded.    If appropriate, please consider using the following CMS eligible diagnosis within your visit encounter:  Statin myopathy (G72.0)  - This will complete/close this insurer-identified gap for this calendar year.    Please let me know if I can further assist, or if you would like me to try to reach patient to discuss any further.  Thank you,  Nata Lopez, PharmD, [facility]  Population Health Pharmacist  [facility] Clinical Pharmacy  Department, toll free: [phone], option 1    ==============================================================    [facility] CLINICAL PHARMACY: STATIN THERAPY REVIEW  Identified statin use in persons with diabetes care gap per [employer].    Patient also appears to be prescribed: Diabetes (% YTD, passed measure year)    ASSESSMENT  STATIN GAP IDENTIFIED    Per chart review: patient may be excluded with appropriate CMS eligible dx code for SUPD:  Myopathy (G72.0, G72.89, G72.9)  As below, muscle side effects with statin therapy (including atorvastatin 10mg three times weekly)    Allergies   Allergen Reactions    Pcn [Penicillins] Shortness Of Breath    Penicillin G Shortness Of Breath    Statins Other (See Comments)     myalgias    Nsaids Nausea And Vomiting and Other (See Comments)     Lab Results   Component Value Date    CHOL 247 (H) 07/25/2024    TRIG 192 (H) 07/25/2024    HDL 44 07/25/2024     Lab Results   Component Value Date     (H) 07/25/2024      ALT   Date Value Ref Range Status   07/25/2024

## 2024-11-26 ENCOUNTER — TELEMEDICINE (OUTPATIENT)
Dept: FAMILY MEDICINE CLINIC | Age: 69
End: 2024-11-26

## 2024-11-26 DIAGNOSIS — J01.10 ACUTE FRONTAL SINUSITIS, RECURRENCE NOT SPECIFIED: Primary | ICD-10-CM

## 2024-11-26 DIAGNOSIS — G89.29 CHRONIC PAIN OF BOTH KNEES: ICD-10-CM

## 2024-11-26 DIAGNOSIS — G72.0 STATIN MYOPATHY: ICD-10-CM

## 2024-11-26 DIAGNOSIS — M54.41 CHRONIC BILATERAL LOW BACK PAIN WITH BILATERAL SCIATICA: ICD-10-CM

## 2024-11-26 DIAGNOSIS — G89.29 CHRONIC BILATERAL LOW BACK PAIN WITH BILATERAL SCIATICA: ICD-10-CM

## 2024-11-26 DIAGNOSIS — M25.561 CHRONIC PAIN OF BOTH KNEES: ICD-10-CM

## 2024-11-26 DIAGNOSIS — M25.562 CHRONIC PAIN OF BOTH KNEES: ICD-10-CM

## 2024-11-26 DIAGNOSIS — M54.42 CHRONIC BILATERAL LOW BACK PAIN WITH BILATERAL SCIATICA: ICD-10-CM

## 2024-11-26 DIAGNOSIS — H60.543 ECZEMA OF EXTERNAL EAR, BILATERAL: ICD-10-CM

## 2024-11-26 DIAGNOSIS — N30.00 ACUTE CYSTITIS WITHOUT HEMATURIA: ICD-10-CM

## 2024-11-26 DIAGNOSIS — T46.6X5A STATIN MYOPATHY: ICD-10-CM

## 2024-11-26 DIAGNOSIS — Z12.11 COLON CANCER SCREENING: ICD-10-CM

## 2024-11-26 DIAGNOSIS — E11.9 TYPE 2 DIABETES MELLITUS WITHOUT COMPLICATION, WITHOUT LONG-TERM CURRENT USE OF INSULIN (HCC): ICD-10-CM

## 2024-11-26 RX ORDER — MOMETASONE FUROATE 1 MG/G
OINTMENT TOPICAL
Qty: 45 G | Refills: 0 | Status: SHIPPED | OUTPATIENT
Start: 2024-11-26

## 2024-11-26 RX ORDER — CEFDINIR 300 MG/1
300 CAPSULE ORAL 2 TIMES DAILY
Qty: 20 CAPSULE | Refills: 0 | Status: SHIPPED | OUTPATIENT
Start: 2024-11-26 | End: 2024-12-06

## 2024-11-26 RX ORDER — HYDROCODONE BITARTRATE AND ACETAMINOPHEN 5; 325 MG/1; MG/1
1 TABLET ORAL EVERY 8 HOURS PRN
Qty: 9 TABLET | Refills: 0 | Status: SHIPPED | OUTPATIENT
Start: 2024-11-26 | End: 2024-11-29

## 2024-11-26 ASSESSMENT — ENCOUNTER SYMPTOMS
TROUBLE SWALLOWING: 0
EYE PAIN: 0
SHORTNESS OF BREATH: 0
DIARRHEA: 0
COLOR CHANGE: 0
COUGH: 0
BACK PAIN: 0
CHEST TIGHTNESS: 0
SINUS PAIN: 1
CONSTIPATION: 0
SINUS PRESSURE: 1
ABDOMINAL PAIN: 0

## 2024-11-26 NOTE — PROGRESS NOTES
Ana Paula House, was evaluated through a synchronous (real-time) audio-video encounter. The patient (or guardian if applicable) is aware that this is a billable service, which includes applicable co-pays. This Virtual Visit was conducted with patient's (and/or legal guardian's) consent. Patient identification was verified, and a caregiver was present when appropriate.   The patient was located at Home: [address] [place] [id number]  Provider was located at Facility (Appt Dept): [address]  [place] [address]  Confirm you are appropriately licensed, registered, or certified to deliver care in the state where the patient is located as indicated above. If you are not or unsure, please re-schedule the visit: Yes, I confirm.     Ana Paula House (:  1955) is a Established patient, presenting virtually for evaluation of the following:      Below is the assessment and plan developed based on review of pertinent history, physical exam, labs, studies, and medications.        Diagnosis Orders   1. Acute frontal sinusitis, recurrence not specified  cefdinir (OMNICEF) 300 MG capsule      2. Eczema of external ear, bilateral  mometasone (ELOCON) 0.1 % ointment      3. Acute cystitis without hematuria  cefdinir (OMNICEF) 300 MG capsule      4. Colon cancer screening  Fecal DNA Colorectal cancer screening (Cologuard)      5. Type 2 diabetes mellitus without complication, without long-term current use of insulin (HCC)  Hemoglobin A1C    Microalbumin / Creatinine Urine Ratio      6. Chronic pain of both knees  HYDROcodone-acetaminophen (NORCO) 5-325 MG per tablet      7. Chronic bilateral low back pain with bilateral sciatica  HYDROcodone-acetaminophen (NORCO) 5-325 MG per tablet        Omnicef as ordered, pt previously tolerated keflex well.  Plan as above.  F/u if no improvement.  Side effects, adverse effects of the medication prescribed today, as well as treatment plan/ rationale and

## 2024-11-29 NOTE — TELEPHONE ENCOUNTER
Noted statin myopathy dx with visit, thank you for reviewing    =======================================================   For Pharmacy Admin Tracking Only    Program: Pop [facility]  CPA in place:  No  Recommendation Provided To: Provider: 1 via Note to Provider  Intervention Accepted By: Provider: 1  Gap Closed?: Yes   Time Spent (min): 15

## 2024-12-09 DIAGNOSIS — J45.41 MODERATE PERSISTENT ASTHMA WITH ACUTE EXACERBATION: ICD-10-CM

## 2024-12-09 RX ORDER — MONTELUKAST SODIUM 10 MG/1
10 TABLET ORAL DAILY
Qty: 90 TABLET | Refills: 1 | Status: CANCELLED | OUTPATIENT
Start: 2024-12-09

## 2024-12-09 NOTE — TELEPHONE ENCOUNTER
Comments: pt states that she is doing much better.  Pt will schedule an appt at a later date.    Last Office Visit (last PCP visit):   11/26/2024    Next Visit Date:  No future appointments.    **If hasn't been seen in over a year OR hasn't followed up according to last diabetes/ADHD visit, make appointment for patient before sending refill to provider.    Rx requested:  Requested Prescriptions     Pending Prescriptions Disp Refills    albuterol sulfate HFA (PROVENTIL;VENTOLIN;PROAIR) 108 (90 Base) MCG/ACT inhaler 6.7 each 2     Sig: Inhale 2 puffs into the lungs 4 times daily as needed for Wheezing

## 2024-12-10 DIAGNOSIS — F33.1 MODERATE EPISODE OF RECURRENT MAJOR DEPRESSIVE DISORDER (HCC): ICD-10-CM

## 2024-12-10 RX ORDER — SERTRALINE HYDROCHLORIDE 100 MG/1
100 TABLET, FILM COATED ORAL DAILY
Qty: 90 TABLET | Refills: 1 | Status: SHIPPED | OUTPATIENT
Start: 2024-12-10

## 2024-12-10 NOTE — TELEPHONE ENCOUNTER
Comments:     Last Office Visit (last PCP visit):   Visit date not found    Next Visit Date:  Future Appointments   Date Time Provider Department Center   12/13/2024  9:45 AM Sam Kim, [facility]  [facility]       **If hasn't been seen in over a year OR hasn't followed up according to last diabetes/ADHD visit, make appointment for patient before sending refill to provider.    Rx requested:  Requested Prescriptions     Pending Prescriptions Disp Refills    sertraline (ZOLOFT) 100 MG tablet [Pharmacy Med Name: SERTRALINE  MG TABLET] 90 tablet 1     Sig: TAKE 1 TABLET BY MOUTH EVERY DAY

## 2024-12-12 NOTE — TELEPHONE ENCOUNTER
Instead of Singulair pt says QVAR inhaler will work. Says insurance will cover for it.    Is this possible?

## 2024-12-16 RX ORDER — ALBUTEROL SULFATE 90 UG/1
2 INHALANT RESPIRATORY (INHALATION) 4 TIMES DAILY PRN
Qty: 6.7 EACH | Refills: 2 | Status: SHIPPED | OUTPATIENT
Start: 2024-12-16

## 2024-12-20 ENCOUNTER — OFFICE VISIT (OUTPATIENT)
Dept: ORTHOPEDIC SURGERY | Age: 69
End: 2024-12-20

## 2024-12-20 VITALS
WEIGHT: 167 LBS | HEART RATE: 80 BPM | BODY MASS INDEX: 31.53 KG/M2 | TEMPERATURE: 97.2 F | OXYGEN SATURATION: 93 % | HEIGHT: 61 IN

## 2024-12-20 DIAGNOSIS — M17.12 PRIMARY OSTEOARTHRITIS OF LEFT KNEE: ICD-10-CM

## 2024-12-20 DIAGNOSIS — M17.11 PRIMARY OSTEOARTHRITIS OF RIGHT KNEE: Primary | ICD-10-CM

## 2024-12-20 RX ORDER — LIDOCAINE HYDROCHLORIDE 10 MG/ML
8 INJECTION, SOLUTION INFILTRATION; PERINEURAL ONCE
Status: COMPLETED | OUTPATIENT
Start: 2024-12-20 | End: 2024-12-20

## 2024-12-20 RX ORDER — TRIAMCINOLONE ACETONIDE 40 MG/ML
80 INJECTION, SUSPENSION INTRA-ARTICULAR; INTRAMUSCULAR ONCE
Status: COMPLETED | OUTPATIENT
Start: 2024-12-20 | End: 2024-12-20

## 2024-12-20 RX ADMIN — TRIAMCINOLONE ACETONIDE 80 MG: 40 INJECTION, SUSPENSION INTRA-ARTICULAR; INTRAMUSCULAR at 11:12

## 2024-12-20 RX ADMIN — LIDOCAINE HYDROCHLORIDE 8 ML: 10 INJECTION, SOLUTION INFILTRATION; PERINEURAL at 11:12

## 2024-12-20 ASSESSMENT — ENCOUNTER SYMPTOMS
GASTROINTESTINAL NEGATIVE: 1
RESPIRATORY NEGATIVE: 1
EYES NEGATIVE: 1

## 2024-12-20 NOTE — PROGRESS NOTES
complications.  Patient tolerated this well.    Post-procedure discomfort can be alleviated with additional medications/ice/elevation/rest over the first 24 hours as recommended.     The patient tolerated the procedure well.    If fluid was aspirated that was abnormal it was sent for further studies  in sterile specimen container as ordered to the lab     Diagnosis Orders   1. Primary osteoarthritis of right knee  DRAIN/INJECT LARGE JOINT/BURSA    lidocaine 1 % injection 8 mL    triamcinolone acetonide (KENALOG-40) injection 80 mg      2. Primary osteoarthritis of left knee                 On this date 12/20/2024 I have spent 35 minutes reviewing previous notes, test results and face to face with the patient discussing the diagnosis and importance of compliance with the treatment plan as well as documenting on the day of the visit.  Duration time required for the intra-articular cortisone injection of the right knee is not included in the aforementioned 35-minute    The patient (or guardian, if applicable) and other individuals in attendance with the patient were advised that Artificial Intelligence will be utilized during this visit to record, process the conversation to generate a clinical note, and support improvement of the AI technology. The patient (or guardian, if applicable) and other individuals in attendance at the appointment consented to the use of AI, including the recording.      An electronic signature was used to authenticate this note.    --BAMBI Wei

## 2025-01-03 ENCOUNTER — OFFICE VISIT (OUTPATIENT)
Dept: ORTHOPEDIC SURGERY | Age: 70
End: 2025-01-03
Payer: MEDICARE

## 2025-01-03 VITALS
BODY MASS INDEX: 31.53 KG/M2 | HEART RATE: 84 BPM | TEMPERATURE: 97.1 F | WEIGHT: 167 LBS | OXYGEN SATURATION: 93 % | HEIGHT: 61 IN

## 2025-01-03 DIAGNOSIS — M23.91 LOCKING KNEE, RIGHT: ICD-10-CM

## 2025-01-03 DIAGNOSIS — M17.11 PRIMARY OSTEOARTHRITIS OF RIGHT KNEE: Primary | ICD-10-CM

## 2025-01-03 PROCEDURE — 1123F ACP DISCUSS/DSCN MKR DOCD: CPT | Performed by: PHYSICIAN ASSISTANT

## 2025-01-03 PROCEDURE — 1125F AMNT PAIN NOTED PAIN PRSNT: CPT | Performed by: PHYSICIAN ASSISTANT

## 2025-01-03 PROCEDURE — 99214 OFFICE O/P EST MOD 30 MIN: CPT | Performed by: PHYSICIAN ASSISTANT

## 2025-01-03 ASSESSMENT — ENCOUNTER SYMPTOMS
EYES NEGATIVE: 1
RESPIRATORY NEGATIVE: 1
GASTROINTESTINAL NEGATIVE: 1

## 2025-01-03 NOTE — PROGRESS NOTES
Ana Paula House (:  1955) is a 69 y.o. female,Established patient, here for evaluation of the following chief complaint(s):  Follow-up (Right knee OA. )         Assessment & Plan  Primary osteoarthritis of right knee   Chronic, not at goal (unstable), changes made today: Proceed with MRI scan    Orders:    MRI KNEE RIGHT WO CONTRAST; Future    Locking knee, right   Chronic, not at goal (unstable), changes made today: Proceed with MRI scan    Orders:    MRI KNEE RIGHT WO CONTRAST; Future      Assessment & Plan  1. Knee pain.  Possible meniscal or cartilage issue suggested by locking and increased pain. MRI of the knee will be ordered.    2. Shoulder pain.  Likely tendon irritation as strength is consistent despite pain. Advise more time for healing.    3. Head injury.  Headache post-fall with no visible blood or flat spots. Advise monitoring symptoms and avoiding strenuous activities.        No follow-ups on file.       Subjective   History of Present Illness  The patient presents for evaluation of knee pain, shoulder pain, and head injury.    Knee Pain  Her knee pain has worsened post-injection, with increased pain and frequent locking episodes causing falls.  - Onset: Post-injection.  - Character: Increased pain and frequent locking episodes.  - Severity: Worsened pain causing falls.    Shoulder Pain  She reports a recent fall on her shoulder, causing pain similar to a previous right shoulder injury, but retains full range of motion.  - Onset: Recent fall.  - Location: Shoulder.  - Character: Pain similar to a previous right shoulder injury.  - Severity: Retains full range of motion despite pain.    Head Injury  This morning, she fell and hit her head on a concrete pad, feeling dazed and currently experiencing a headache.  - Onset: This morning.  - Location: Head.  - Character: Hit head on a concrete pad, feeling dazed, and experiencing a headache.             Review of Systems   Constitutional:

## 2025-02-06 DIAGNOSIS — I10 ESSENTIAL HYPERTENSION: Chronic | ICD-10-CM

## 2025-02-06 NOTE — TELEPHONE ENCOUNTER
Comments:     Last Office Visit (last PCP visit):   11/26/2024    Next Visit Date:  No future appointments.    **If hasn't been seen in over a year OR hasn't followed up according to last diabetes/ADHD visit, make appointment for patient before sending refill to provider.    Rx requested:  Requested Prescriptions      No prescriptions requested or ordered in this encounter

## 2025-02-07 RX ORDER — HYDROCHLOROTHIAZIDE 12.5 MG/1
CAPSULE ORAL
Qty: 90 CAPSULE | Refills: 0 | Status: SHIPPED | OUTPATIENT
Start: 2025-02-07

## 2025-03-12 DIAGNOSIS — I10 ESSENTIAL HYPERTENSION: Chronic | ICD-10-CM

## 2025-03-12 NOTE — TELEPHONE ENCOUNTER
Comments: LM on VM that she is due for appt end of May    Last Office Visit (last PCP visit):   11/26/2024    Next Visit Date:  No future appointments.    **If hasn't been seen in over a year OR hasn't followed up according to last diabetes/ADHD visit, make appointment for patient before sending refill to provider.    Rx requested:  Requested Prescriptions      No prescriptions requested or ordered in this encounter

## 2025-03-13 RX ORDER — METOPROLOL TARTRATE 100 MG/1
100 TABLET ORAL 2 TIMES DAILY
Qty: 180 TABLET | Refills: 1 | Status: SHIPPED | OUTPATIENT
Start: 2025-03-13

## 2025-04-12 DIAGNOSIS — E11.9 TYPE 2 DIABETES MELLITUS WITHOUT COMPLICATION, WITHOUT LONG-TERM CURRENT USE OF INSULIN: ICD-10-CM

## 2025-04-12 DIAGNOSIS — H60.543 ECZEMA OF EXTERNAL EAR, BILATERAL: ICD-10-CM

## 2025-04-14 NOTE — TELEPHONE ENCOUNTER
Comments:     Last Office Visit (last PCP visit):   11/26/2024    Next Visit Date:  No future appointments.    **If hasn't been seen in over a year OR hasn't followed up according to last diabetes/ADHD visit, make appointment for patient before sending refill to provider.    Rx requested:  Requested Prescriptions     Pending Prescriptions Disp Refills    glipiZIDE (GLUCOTROL XL) 10 MG extended release tablet [Pharmacy Med Name: GLIPIZIDE ER 10 MG TABLET] 180 tablet 1     Sig: TAKE 1 TABLET BY MOUTH TWICE A DAY    mometasone (ELOCON) 0.1 % ointment [Pharmacy Med Name: MOMETASONE FUROATE 0.1% OINT] 45 g 0     Sig: APPLY TO AFFECTED AREA TOPICALLY EVERY DAY

## 2025-04-15 RX ORDER — GLIPIZIDE 10 MG/1
10 TABLET, FILM COATED, EXTENDED RELEASE ORAL 2 TIMES DAILY
Qty: 180 TABLET | Refills: 1 | Status: SHIPPED | OUTPATIENT
Start: 2025-04-15

## 2025-04-15 RX ORDER — MOMETASONE FUROATE 1 MG/G
OINTMENT TOPICAL
Qty: 45 G | Refills: 0 | Status: SHIPPED | OUTPATIENT
Start: 2025-04-15

## 2025-05-08 ENCOUNTER — TELEPHONE (OUTPATIENT)
Dept: PHARMACY | Facility: CLINIC | Age: 70
End: 2025-05-08

## 2025-05-08 ENCOUNTER — OFFICE VISIT (OUTPATIENT)
Dept: FAMILY MEDICINE CLINIC | Age: 70
End: 2025-05-08

## 2025-05-08 VITALS
HEIGHT: 61 IN | HEART RATE: 73 BPM | BODY MASS INDEX: 31.91 KG/M2 | OXYGEN SATURATION: 94 % | WEIGHT: 169 LBS | SYSTOLIC BLOOD PRESSURE: 118 MMHG | DIASTOLIC BLOOD PRESSURE: 82 MMHG

## 2025-05-08 DIAGNOSIS — R51.9 DAILY HEADACHE: ICD-10-CM

## 2025-05-08 DIAGNOSIS — F33.1 MODERATE EPISODE OF RECURRENT MAJOR DEPRESSIVE DISORDER (HCC): ICD-10-CM

## 2025-05-08 DIAGNOSIS — R35.0 URINARY FREQUENCY: ICD-10-CM

## 2025-05-08 DIAGNOSIS — E11.9 TYPE 2 DIABETES MELLITUS WITHOUT COMPLICATION, WITHOUT LONG-TERM CURRENT USE OF INSULIN (HCC): ICD-10-CM

## 2025-05-08 DIAGNOSIS — S09.90XA CLOSED HEAD INJURY, INITIAL ENCOUNTER: ICD-10-CM

## 2025-05-08 DIAGNOSIS — M54.41 CHRONIC BILATERAL LOW BACK PAIN WITH BILATERAL SCIATICA: ICD-10-CM

## 2025-05-08 DIAGNOSIS — N32.81 OAB (OVERACTIVE BLADDER): Primary | ICD-10-CM

## 2025-05-08 DIAGNOSIS — R42 DIZZINESS: ICD-10-CM

## 2025-05-08 DIAGNOSIS — G89.29 CHRONIC BILATERAL LOW BACK PAIN WITH BILATERAL SCIATICA: ICD-10-CM

## 2025-05-08 DIAGNOSIS — G72.0 STATIN MYOPATHY: ICD-10-CM

## 2025-05-08 DIAGNOSIS — M62.830 MUSCLE SPASM OF BACK: ICD-10-CM

## 2025-05-08 DIAGNOSIS — T46.6X5A STATIN MYOPATHY: ICD-10-CM
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
## 2025-05-08 DIAGNOSIS — G47.00 INSOMNIA, UNSPECIFIED TYPE: ICD-10-CM

## 2025-05-08 DIAGNOSIS — M54.42 CHRONIC BILATERAL LOW BACK PAIN WITH BILATERAL SCIATICA: ICD-10-CM

## 2025-05-08 LAB
ALBUMIN SERPL-MCNC: 4.3 G/DL (ref 3.5–4.6)
ALP SERPL-CCNC: 112 U/L (ref 40–130)
ALT SERPL-CCNC: 17 U/L (ref 0–33)
ANION GAP SERPL CALCULATED.3IONS-SCNC: 15 MEQ/L (ref 9–15)
AST SERPL-CCNC: 18 U/L (ref 0–35)
BILIRUB SERPL-MCNC: 0.3 MG/DL (ref 0.2–0.7)
BUN SERPL-MCNC: 41 MG/DL (ref 8–23)
CALCIUM SERPL-MCNC: 9.4 MG/DL (ref 8.5–9.9)
CHLORIDE SERPL-SCNC: 95 MEQ/L (ref 95–107)
CO2 SERPL-SCNC: 26 MEQ/L (ref 20–31)
CREAT SERPL-MCNC: 1.18 MG/DL (ref 0.5–0.9)
GLOBULIN SER CALC-MCNC: 3 G/DL (ref 2.3–3.5)
GLUCOSE SERPL-MCNC: 316 MG/DL (ref 70–99)
POTASSIUM SERPL-SCNC: 5 MEQ/L (ref 3.4–4.9)
PROT SERPL-MCNC: 7.3 G/DL (ref 6.3–8)
SODIUM SERPL-SCNC: 136 MEQ/L (ref 135–144)

## 2025-05-08 RX ORDER — TRAZODONE HYDROCHLORIDE 100 MG/1
100 TABLET ORAL NIGHTLY
Qty: 30 TABLET | Refills: 2 | Status: SHIPPED | OUTPATIENT
Start: 2025-05-08

## 2025-05-08 RX ORDER — MIRABEGRON 25 MG/1
25 TABLET, FILM COATED, EXTENDED RELEASE ORAL DAILY
Qty: 30 TABLET | Refills: 2 | Status: SHIPPED | OUTPATIENT
Start: 2025-05-08

## 2025-05-08 RX ORDER — CYCLOBENZAPRINE HCL 10 MG
10 TABLET ORAL 2 TIMES DAILY PRN
Qty: 40 TABLET | Refills: 2 | Status: SHIPPED | OUTPATIENT
Start: 2025-05-08

## 2025-05-08 RX ORDER — ACYCLOVIR 400 MG/1
TABLET ORAL
Qty: 1 EACH | Refills: 5 | Status: SHIPPED | OUTPATIENT
Start: 2025-05-08

## 2025-05-08 RX ORDER — ACYCLOVIR 400 MG/1
1 TABLET ORAL DAILY
Qty: 1 EACH | Refills: 0 | Status: SHIPPED | OUTPATIENT
Start: 2025-05-08

## 2025-05-08 SDOH — ECONOMIC STABILITY: FOOD INSECURITY: WITHIN THE PAST 12 MONTHS, YOU WORRIED THAT YOUR FOOD WOULD RUN OUT BEFORE YOU GOT MONEY TO BUY MORE.: NEVER TRUE

## 2025-05-08 SDOH — ECONOMIC STABILITY: FOOD INSECURITY: WITHIN THE PAST 12 MONTHS, THE FOOD YOU BOUGHT JUST DIDN'T LAST AND YOU DIDN'T HAVE MONEY TO GET MORE.: NEVER TRUE

## 2025-05-08 ASSESSMENT — ENCOUNTER SYMPTOMS
BACK PAIN: 0
COLOR CHANGE: 0
DIARRHEA: 0
CHEST TIGHTNESS: 0
COUGH: 0
TROUBLE SWALLOWING: 0
SHORTNESS OF BREATH: 0
CONSTIPATION: 0
EYE PAIN: 0
ABDOMINAL PAIN: 0

## 2025-05-08 ASSESSMENT — PATIENT HEALTH QUESTIONNAIRE - PHQ9
2. FEELING DOWN, DEPRESSED OR HOPELESS: NEARLY EVERY DAY
6. FEELING BAD ABOUT YOURSELF - OR THAT YOU ARE A FAILURE OR HAVE LET YOURSELF OR YOUR FAMILY DOWN: NEARLY EVERY DAY
SUM OF ALL RESPONSES TO PHQ QUESTIONS 1-9: 17
5. POOR APPETITE OR OVEREATING: NOT AT ALL
SUM OF ALL RESPONSES TO PHQ QUESTIONS 1-9: 17
10. IF YOU CHECKED OFF ANY PROBLEMS, HOW DIFFICULT HAVE THESE PROBLEMS MADE IT FOR YOU TO DO YOUR WORK, TAKE CARE OF THINGS AT HOME, OR GET ALONG WITH OTHER PEOPLE: SOMEWHAT DIFFICULT
9. THOUGHTS THAT YOU WOULD BE BETTER OFF DEAD, OR OF HURTING YOURSELF: NOT AT ALL
7. TROUBLE CONCENTRATING ON THINGS, SUCH AS READING THE NEWSPAPER OR WATCHING TELEVISION: SEVERAL DAYS
SUM OF ALL RESPONSES TO PHQ QUESTIONS 1-9: 17
8. MOVING OR SPEAKING SO SLOWLY THAT OTHER PEOPLE COULD HAVE NOTICED. OR THE OPPOSITE, BEING SO FIGETY OR RESTLESS THAT YOU HAVE BEEN MOVING AROUND A LOT MORE THAN USUAL: SEVERAL DAYS
4. FEELING TIRED OR HAVING LITTLE ENERGY: NEARLY EVERY DAY
1. LITTLE INTEREST OR PLEASURE IN DOING THINGS: NEARLY EVERY DAY
SUM OF ALL RESPONSES TO PHQ QUESTIONS 1-9: 17
3. TROUBLE FALLING OR STAYING ASLEEP: NEARLY EVERY DAY

## 2025-05-08 NOTE — TELEPHONE ENCOUNTER
Carmina Buckley, APRN - CNP, from below, appointment with you 5/8/25 - identified with a care gap for diabetes without a statin  Per patient chart review: Was previously excluded with a dx code of Statin myopathy (G72.0,T46.6X5A) at 11/26/24 visit. This dx code must be entered once each calendar year for the patient to be excluded from taking a statin.    If appropriate, please consider using the following CMS eligible diagnosis within your visit encounter:  Statin myopathy (G72.0)  - This will complete/close this insurer-identified gap for this calendar year.    Please let me know if I can further assist.  Thank you,  Nata Lopez, PharmD, [facility]  Population Health Pharmacist  [facility]  Department, toll free: [phone], option 1    ==============================================================    [facility]: STATIN THERAPY REVIEW  Identified statin use in persons with diabetes care gap per [employer]. Records dated: 2024.    Patient also appears to be prescribed: Diabetes (96% PDC in 2024, 100% PDC YTD)    ASSESSMENT  STATIN GAP IDENTIFIED    Per chart review: patient may be excluded with appropriate CMS eligible dx code for SUPD:  Myopathy (G72.0, G72.89, G72.9)  Muscle side effects with statin therapy (including atorvastatin 10mg three times weekly)  11/26/24 PCP VV: Statin myopathy (G72.0,T46.6X5A) dx used    Allergies   Allergen Reactions    Pcn [Penicillins] Shortness Of Breath    Penicillin G Shortness Of Breath    Statins Other (See Comments)     myalgias    Nsaids Nausea And Vomiting and Other (See Comments)     PLAN  The following are interventions that have been identified:   Statin Gap (Diabetes): patient may be excluded with a CMS eligible dx code if entered into a billable office visit as a visit diagnosis - statin myopathy dx as used last year    Last Visit: 7/25/24  Next Visit: 5/8/25

## 2025-05-08 NOTE — PROGRESS NOTES
Subjective  Chief Complaint   Patient presents with    Fall     States that she had a fall about 2 weeks ago at home. States that she fell out of bed and hit her head,face, LT eye. States that the pain was phenomenal and then she became nauseous. States that now she has a HA every morning. States that she did not go to ER or see anyone for this.    Diabetes     Would like dexcom G7    OAB     States that she wet herself a few times and this has been going on for a while.    Health Maintenance     Cologuard, gabrielle, keitho previously ordered.       History of Present Illness  The patient is a 69-year-old female who presents today to discuss a fall and headaches, depression, overactive bladder, and diabetes.    Approximately 2 weeks ago, she experienced a fall from her bed during sleep, resulting in facial impact with a dresser. The incident led to bruising around her left eye and a significant hematoma above her left eyebrow. She also reported nausea, which has since subsided, but continues to experience intermittent dizziness and daily headaches. She did not seek immediate medical attention at an emergency room or urgent care facility. There was no loss of consciousness or memory associated with the event. Her symptoms have remained consistent over the past 2 weeks.     She has been experiencing overactive bladder symptoms intermittently for several years, dating back to a vacation in [place]. She reports occasional urinary incontinence, even when attempting to urinate prior to leaving her home. She also experiences difficulty initiating urination, often requiring significant effort to produce a small amount of urine. She expresses a desire to avoid wearing adult diapers.    She has been managing her blood sugar levels without the use of Dexcom, occasionally indulging in sweets but generally maintaining a balanced diet. She is currently taking glipizide and berberine, the latter of which she believes has contributed

## 2025-05-09 LAB
ESTIMATED AVERAGE GLUCOSE: 246 MG/DL
HBA1C MFR BLD: 10.2 % (ref 4–6)

## 2025-05-13 ENCOUNTER — RESULTS FOLLOW-UP (OUTPATIENT)
Dept: FAMILY MEDICINE CLINIC | Age: 70
End: 2025-05-13

## 2025-05-13 ENCOUNTER — HOSPITAL ENCOUNTER (OUTPATIENT)
Dept: LAB | Age: 70
Discharge: HOME | End: 2025-05-13
Payer: MEDICARE

## 2025-05-13 ENCOUNTER — HOSPITAL ENCOUNTER (OUTPATIENT)
Dept: CT IMAGING | Age: 70
Discharge: HOME | End: 2025-05-15
Payer: MEDICARE
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
## 2025-05-13 DIAGNOSIS — S09.90XA CLOSED HEAD INJURY, INITIAL ENCOUNTER: ICD-10-CM

## 2025-05-13 DIAGNOSIS — R51.9 DAILY HEADACHE: ICD-10-CM

## 2025-05-13 DIAGNOSIS — N32.81 OAB (OVERACTIVE BLADDER): ICD-10-CM

## 2025-05-13 DIAGNOSIS — R42 DIZZINESS: ICD-10-CM

## 2025-05-13 DIAGNOSIS — N30.00 ACUTE CYSTITIS WITHOUT HEMATURIA: Primary | ICD-10-CM

## 2025-05-13 DIAGNOSIS — R35.0 URINARY FREQUENCY: ICD-10-CM

## 2025-05-13 LAB
BACTERIA URNS QL MICRO: ABNORMAL /HPF
BILIRUB UR QL STRIP: NEGATIVE
CLARITY UR: CLEAR
COLOR UR: YELLOW
EPI CELLS #/AREA URNS AUTO: ABNORMAL /HPF (ref 0–5)
GLUCOSE UR STRIP-MCNC: 500 MG/DL
HGB UR QL STRIP: NEGATIVE
HYALINE CASTS #/AREA URNS AUTO: ABNORMAL /HPF (ref 0–5)
KETONES UR STRIP-MCNC: NEGATIVE MG/DL
LEUKOCYTE ESTERASE UR QL STRIP: NEGATIVE
NITRITE UR QL STRIP: POSITIVE
PH UR STRIP: 5 [PH] (ref 5–9)
PROT UR STRIP-MCNC: NEGATIVE MG/DL
RBC #/AREA URNS AUTO: ABNORMAL /HPF (ref 0–5)
SP GR UR STRIP: 1.02 (ref 1–1.03)
UROBILINOGEN UR STRIP-ACNC: 0.2 E.U./DL
WBC #/AREA URNS AUTO: ABNORMAL /HPF (ref 0–5)

## 2025-05-13 PROCEDURE — 81001 URINALYSIS AUTO W/SCOPE: CPT

## 2025-05-13 PROCEDURE — 70450 CT HEAD/BRAIN W/O DYE: CPT

## 2025-05-23 RX ORDER — NITROFURANTOIN 25; 75 MG/1; MG/1
100 CAPSULE ORAL 2 TIMES DAILY
Qty: 10 CAPSULE | Refills: 0 | Status: SHIPPED | OUTPATIENT
Start: 2025-05-23 | End: 2025-05-28

## 2025-05-28 ENCOUNTER — OFFICE VISIT (OUTPATIENT)
Dept: FAMILY MEDICINE CLINIC | Age: 70
End: 2025-05-28

## 2025-05-28 VITALS
OXYGEN SATURATION: 95 % | SYSTOLIC BLOOD PRESSURE: 128 MMHG | HEART RATE: 100 BPM | BODY MASS INDEX: 31.53 KG/M2 | HEIGHT: 61 IN | DIASTOLIC BLOOD PRESSURE: 68 MMHG | WEIGHT: 167 LBS

## 2025-05-28 DIAGNOSIS — N28.9 DECREASED RENAL FUNCTION: ICD-10-CM

## 2025-05-28 DIAGNOSIS — M25.512 ACUTE PAIN OF LEFT SHOULDER: ICD-10-CM

## 2025-05-28 DIAGNOSIS — E11.65 TYPE 2 DIABETES MELLITUS WITH HYPERGLYCEMIA, WITHOUT LONG-TERM CURRENT USE OF INSULIN (HCC): Primary | ICD-10-CM

## 2025-05-28 LAB
ANION GAP SERPL CALCULATED.3IONS-SCNC: 13 MEQ/L (ref 9–15)
BUN SERPL-MCNC: 30 MG/DL (ref 8–23)
CALCIUM SERPL-MCNC: 9.7 MG/DL (ref 8.5–9.9)
CHLORIDE SERPL-SCNC: 98 MEQ/L (ref 95–107)
CO2 SERPL-SCNC: 28 MEQ/L (ref 20–31)
CREAT SERPL-MCNC: 1.08 MG/DL (ref 0.5–0.9)
GLUCOSE SERPL-MCNC: 367 MG/DL (ref 70–99)
POTASSIUM SERPL-SCNC: 4.5 MEQ/L (ref 3.4–4.9)
SODIUM SERPL-SCNC: 139 MEQ/L (ref 135–144)

## 2025-05-28 ASSESSMENT — ENCOUNTER SYMPTOMS
BACK PAIN: 0
COUGH: 0
SHORTNESS OF BREATH: 0
CHEST TIGHTNESS: 0
COLOR CHANGE: 0
CONSTIPATION: 0
TROUBLE SWALLOWING: 0
EYE PAIN: 0
DIARRHEA: 0
ABDOMINAL PAIN: 0

## 2025-05-28 NOTE — PROGRESS NOTES
Subjective  Chief Complaint   Patient presents with    Results     Go over labs    Shoulder Injury     States that her LT shoulder has been hurting and she thinks that she may have injured in April when she fell out of bed.       History of Present Illness  The patient is a 69-year-old female who presents today to discuss lab results.    She has been managing her diabetes with glipizide 10 mg twice daily and self-administered berberine, which she reports as beneficial in maintaining regular bowel movements. She has a lifelong history of constipation. Previously, she was on metformin but discontinued its use due to negative feedback from others. She is open to exploring additional treatment options but expresses a preference against injectable medications.    She reports no history of thyroid cancer or pancreatitis. She has a past medical history of cholecystectomy. She also mentions an intolerance to Aleve and Motrin.    She has a history of kidney infection dating back to 1976 or 1977, which was severe enough to cause significant pain and required immediate medical attention.    She experienced a fall approximately 1 month ago, resulting in left shoulder pain that persists daily. The pain is severe enough to prevent her from wearing a seatbelt. She also recalls a previous fall about 1.5 years ago where she slipped on ice and landed on her left side, impacting the concrete blocks of her garage. She underwent rotator cuff surgery on her right shoulder several years ago and suspects a similar issue with her left shoulder.    PAST SURGICAL HISTORY:  - Cholecystectomy  - Rotator cuff surgery on the right shoulder    FAMILY HISTORY  She was adopted and has no information about her biological family's medical history.      Past Medical History:   Diagnosis Date    Allergic rhinitis     Allergic rhinitis 6/26/2017    Asthma 12/19/2011    Depression     Eczema of external ear 7/3/2017    Fatty infiltration of liver

## 2025-05-30 ENCOUNTER — RESULTS FOLLOW-UP (OUTPATIENT)
Dept: FAMILY MEDICINE CLINIC | Age: 70
End: 2025-05-30

## 2025-06-03 ENCOUNTER — OFFICE VISIT (OUTPATIENT)
Dept: FAMILY MEDICINE CLINIC | Age: 70
End: 2025-06-03

## 2025-06-03 VITALS
SYSTOLIC BLOOD PRESSURE: 128 MMHG | HEART RATE: 71 BPM | DIASTOLIC BLOOD PRESSURE: 76 MMHG | BODY MASS INDEX: 31.53 KG/M2 | OXYGEN SATURATION: 94 % | HEIGHT: 61 IN | WEIGHT: 167 LBS

## 2025-06-03 DIAGNOSIS — M25.472 PAIN AND SWELLING OF LEFT ANKLE: Primary | ICD-10-CM

## 2025-06-03 DIAGNOSIS — M25.572 PAIN AND SWELLING OF LEFT ANKLE: Primary | ICD-10-CM
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
## 2025-06-03 DIAGNOSIS — M79.672 PAIN IN LEFT FOOT: ICD-10-CM

## 2025-06-03 ASSESSMENT — ENCOUNTER SYMPTOMS
CHEST TIGHTNESS: 0
BACK PAIN: 0
COLOR CHANGE: 0
ABDOMINAL PAIN: 0
SHORTNESS OF BREATH: 0
CONSTIPATION: 0
COUGH: 0
DIARRHEA: 0
TROUBLE SWALLOWING: 0
EYE PAIN: 0

## 2025-06-03 NOTE — PROGRESS NOTES
Subjective  Chief Complaint   Patient presents with    Foot Injury     States that she injured her LT foot 5/30/25 at home walking in the door and states that she felt like her foot folded in half. States that she did not trip.       History of Present Illness  The patient is a 69-year-old female who presents today to discuss a left foot injury.    She experienced a fall on 05/30/2025 while entering her residence. During the incident, she did not trip but felt her ankle give way or roll. The subsequent pain in her foot was so severe that she was unable to discern if her ankle was injured. She reports an unusually high pain threshold and notes that the swelling in her foot began the following day. The most intense pain is localized at the bottom of her foot, with no reported discomfort in the ankle. However, she experiences pain when attempting to flex her ankle. She also mentions that it is more challenging for her to maintain her foot in a straight position.    She received a message before leaving her house that her shoulder is fine, indicating a previous concern that has been resolved.      Past Medical History:   Diagnosis Date    Allergic rhinitis     Allergic rhinitis 6/26/2017    Asthma 12/19/2011    Depression     Eczema of external ear 7/3/2017    Fatty infiltration of liver 1/28/2016    Hyperlipidemia 6/28/2017    Hypertension     Instability of ankle joint     Type 2 diabetes mellitus without complication (HCC) 11/20/2013    Type II or unspecified type diabetes mellitus without mention of complication, not stated as uncontrolled      Patient Active Problem List    Diagnosis Date Noted    Moderate episode of recurrent major depressive disorder (HCC) 07/25/2024    Type 2 diabetes mellitus with hyperglycemia 07/25/2024    Dizziness 07/06/2021    Stress reaction 05/17/2018    Chronic right shoulder pain 05/17/2018    Chronic pain of left ankle 01/25/2018    Eczema of external ear 07/03/2017    Hyperlipidemia

## 2025-06-04 ENCOUNTER — RESULTS FOLLOW-UP (OUTPATIENT)
Dept: FAMILY MEDICINE CLINIC | Age: 70
End: 2025-06-04

## 2025-06-05 ENCOUNTER — OFFICE VISIT (OUTPATIENT)
Dept: ORTHOPEDIC SURGERY | Age: 70
End: 2025-06-05

## 2025-06-05 VITALS
OXYGEN SATURATION: 95 % | RESPIRATION RATE: 16 BRPM | TEMPERATURE: 97.1 F | WEIGHT: 167 LBS | HEART RATE: 66 BPM | HEIGHT: 61 IN | BODY MASS INDEX: 31.53 KG/M2

## 2025-06-05 DIAGNOSIS — S92.335A CLOSED NONDISPLACED FRACTURE OF THIRD METATARSAL BONE OF LEFT FOOT, INITIAL ENCOUNTER: ICD-10-CM

## 2025-06-05 DIAGNOSIS — S92.355A CLOSED NONDISPLACED FRACTURE OF FIFTH METATARSAL BONE OF LEFT FOOT, INITIAL ENCOUNTER: ICD-10-CM

## 2025-06-05 DIAGNOSIS — S92.345A CLOSED NONDISPLACED FRACTURE OF FOURTH METATARSAL BONE OF LEFT FOOT, INITIAL ENCOUNTER: Primary | ICD-10-CM

## 2025-06-05 ASSESSMENT — ENCOUNTER SYMPTOMS
SHORTNESS OF BREATH: 0
NAUSEA: 0
COLOR CHANGE: 0
VOMITING: 0

## 2025-06-05 NOTE — PROGRESS NOTES
use: No    Drug use: No    Sexual activity: Not on file   Other Topics Concern    Not on file   Social History Narrative    Not on file     Social Drivers of Health     Financial Resource Strain: Low Risk  (7/25/2024)    Overall Financial Resource Strain (CARDIA)     Difficulty of Paying Living Expenses: Not hard at all   Food Insecurity: No Food Insecurity (5/8/2025)    Hunger Vital Sign     Worried About Running Out of Food in the Last Year: Never true     Ran Out of Food in the Last Year: Never true   Transportation Needs: No Transportation Needs (5/8/2025)    PRAPARE - Transportation     Lack of Transportation (Medical): No     Lack of Transportation (Non-Medical): No   Physical Activity: Insufficiently Active (9/26/2024)    Exercise Vital Sign     Days of Exercise per Week: 3 days     Minutes of Exercise per Session: 10 min   Stress: Not on file   Social Connections: Not on file   Intimate Partner Violence: Not on file   Housing Stability: Low Risk  (5/8/2025)    Housing Stability Vital Sign     Unable to Pay for Housing in the Last Year: No     Number of Times Moved in the Last Year: 0     Homeless in the Last Year: No     Family History   Adopted: Yes     Allergies   Allergen Reactions    Pcn [Penicillins] Shortness Of Breath    Penicillin G Shortness Of Breath    Statins Other (See Comments)     myalgias    Nsaids Nausea And Vomiting and Other (See Comments)     Current Outpatient Medications on File Prior to Visit   Medication Sig Dispense Refill    Tirzepatide (MOUNJARO) 2.5 MG/0.5ML SOAJ pen Inject 2.5 mg into the skin every 7 days 2 mL 0    traZODone (DESYREL) 100 MG tablet Take 1 tablet by mouth nightly 30 tablet 2    cyclobenzaprine (FLEXERIL) 10 MG tablet Take 1 tablet by mouth 2 times daily as needed for Muscle spasms 40 tablet 2    mirabegron (MYRBETRIQ) 25 MG TB24 Take 1 tablet by mouth daily 30 tablet 2    Continuous Glucose  (DEXCOM G7 ) MARILYN 1 each by Does not apply route daily 1

## 2025-06-10 ENCOUNTER — TELEPHONE (OUTPATIENT)
Dept: FAMILY MEDICINE CLINIC | Age: 70
End: 2025-06-10

## 2025-06-10 ENCOUNTER — TELEPHONE (OUTPATIENT)
Dept: GASTROENTEROLOGY | Age: 70
End: 2025-06-10

## 2025-06-26 ENCOUNTER — HOSPITAL ENCOUNTER (OUTPATIENT)
Age: 70
Discharge: HOME | End: 2025-06-28
Payer: MEDICARE

## 2025-06-26 ENCOUNTER — OFFICE VISIT (OUTPATIENT)
Dept: ORTHOPEDIC SURGERY | Age: 70
End: 2025-06-26

## 2025-06-26 ENCOUNTER — HOSPITAL ENCOUNTER (OUTPATIENT)
Dept: GENERAL RADIOLOGY | Age: 70
Discharge: HOME | End: 2025-06-28
Payer: MEDICARE

## 2025-06-26 VITALS
HEART RATE: 85 BPM | BODY MASS INDEX: 32.1 KG/M2 | OXYGEN SATURATION: 99 % | TEMPERATURE: 96.9 F | HEIGHT: 61 IN | WEIGHT: 170 LBS

## 2025-06-26 DIAGNOSIS — S92.355A CLOSED NONDISPLACED FRACTURE OF FIFTH METATARSAL BONE OF LEFT FOOT, INITIAL ENCOUNTER: ICD-10-CM

## 2025-06-26 DIAGNOSIS — S92.345A CLOSED NONDISPLACED FRACTURE OF FOURTH METATARSAL BONE OF LEFT FOOT, INITIAL ENCOUNTER: Primary | ICD-10-CM

## 2025-06-26 DIAGNOSIS — S92.335A CLOSED NONDISPLACED FRACTURE OF THIRD METATARSAL BONE OF LEFT FOOT, INITIAL ENCOUNTER: ICD-10-CM

## 2025-06-26 DIAGNOSIS — S92.345A CLOSED NONDISPLACED FRACTURE OF FOURTH METATARSAL BONE OF LEFT FOOT, INITIAL ENCOUNTER: ICD-10-CM

## 2025-06-26 PROCEDURE — 73630 X-RAY EXAM OF FOOT: CPT

## 2025-06-26 RX ORDER — TRAMADOL HYDROCHLORIDE 50 MG/1
50 TABLET ORAL EVERY 6 HOURS PRN
Qty: 20 TABLET | Refills: 0 | Status: SHIPPED | OUTPATIENT
Start: 2025-06-26 | End: 2025-07-01

## 2025-06-26 NOTE — PROGRESS NOTES
(170 lb)   LMP 01/01/2007   SpO2 99%   BMI 32.12 kg/m²     Left Ankle Exam     Comments:  Mild forefoot edema, no gross deformity  Faint ecchymosis  Tender to palpation in the distal metatarsals 3 through 5  Nontender Lisfranc interval  Sensation intact to light touch throughout  Brisk capillary refill              Radiographs and Laboratory Studies:     Diagnostic Imaging Studies:    Xray Result (most recent):  XR FOOT LEFT (MIN 3 VIEWS) 06/03/2025    Narrative  EXAMINATION:  THREE XRAY VIEWS OF THE LEFT ANKLE; THREE XRAY VIEWS OF THE LEFT FOOT    6/3/2025 3:34 pm    COMPARISON:  Left ankle series from February 20, 2023    HISTORY:  ORDERING SYSTEM PROVIDED HISTORY: Pain and swelling of left ankle, Pain and  swelling of left ankle  TECHNOLOGIST PROVIDED HISTORY:  Reason for exam:->left ankle pain and swelling  What reading provider will be dictating this exam?->CRC; ORDERING SYSTEM  PROVIDED HISTORY: Pain in left foot  TECHNOLOGIST PROVIDED HISTORY:  Reason for exam:->left foot swelling and pain s/p fall  What reading provider will be dictating this exam?->CRC    FINDINGS:  Left ankle: Radiographs of the left ankle demonstrate no fractures with  preserved alignment. Ankle mortise is maintained. Normal appearance of the  talar dome. Osseous mineralization is normal. No soft tissue swelling or  joint effusion.    Left foot: Radiographs of the left foot show fractures of the distal aspects  of the 3rd, 4th, and 5th metatarsal bones with slight cortical offset.  There  is some dorsal forefoot soft tissue swelling.  There are no erosive changes.  No degenerative spurring. Osseous mineralization is normal.    Impression  1.  Fractures of the distal aspects of the 3rd through 5th digit metatarsal  bones with very slight cortical offset.  Regional soft tissue swelling.    2.  No acute osseous findings seen about the left ankle.           Laboratory Studies:   Lab Results   Component Value Date    WBC 7.5 07/25/2024

## 2025-07-03 ASSESSMENT — ENCOUNTER SYMPTOMS
VOMITING: 0
COLOR CHANGE: 0
SHORTNESS OF BREATH: 0
NAUSEA: 0

## 2025-07-21 ENCOUNTER — TELEPHONE (OUTPATIENT)
Dept: FAMILY MEDICINE CLINIC | Age: 70
End: 2025-07-21

## 2025-07-24 DIAGNOSIS — F33.1 MODERATE EPISODE OF RECURRENT MAJOR DEPRESSIVE DISORDER (HCC): ICD-10-CM

## 2025-07-24 RX ORDER — SERTRALINE HYDROCHLORIDE 100 MG/1
100 TABLET, FILM COATED ORAL DAILY
Qty: 90 TABLET | Refills: 1 | Status: SHIPPED | OUTPATIENT
Start: 2025-07-24

## 2025-07-24 NOTE — TELEPHONE ENCOUNTER
Comments:     Last Office Visit (last PCP visit):   6/3/2025    Next Visit Date:  Future Appointments   Date Time Provider Department Center   7/24/2025 10:15 AM Rao Swanson DO [facility]   7/31/2025 12:45 PM Carmina Buckley APRN - CNP [facility]   8/8/2025 10:00 AM Carmina Buckley APRN - CNP [facility]       **If hasn't been seen in over a year OR hasn't followed up according to last diabetes/ADHD visit, make appointment for patient before sending refill to provider.    Rx requested:  Requested Prescriptions     Pending Prescriptions Disp Refills    sertraline (ZOLOFT) 100 MG tablet [Pharmacy Med Name: SERTRALINE  MG TABLET] 90 tablet 1     Sig: TAKE 1 TABLET BY MOUTH EVERY DAY

## 2025-07-31 DIAGNOSIS — E11.9 TYPE 2 DIABETES MELLITUS WITHOUT COMPLICATION, WITHOUT LONG-TERM CURRENT USE OF INSULIN (HCC): ICD-10-CM

## 2025-08-01 RX ORDER — ACYCLOVIR 400 MG/1
1 TABLET ORAL DAILY
Qty: 1 EACH | Refills: 0 | Status: SHIPPED | OUTPATIENT
Start: 2025-08-01

## 2025-08-04 ENCOUNTER — TELEPHONE (OUTPATIENT)
Dept: FAMILY MEDICINE CLINIC | Age: 70
End: 2025-08-04

## 2025-08-07 ENCOUNTER — TELEPHONE (OUTPATIENT)
Dept: PHARMACY | Facility: CLINIC | Age: 70
End: 2025-08-07

## 2025-08-27 DIAGNOSIS — E11.9 TYPE 2 DIABETES MELLITUS WITHOUT COMPLICATION, WITHOUT LONG-TERM CURRENT USE OF INSULIN (HCC): ICD-10-CM

## 2025-08-27 RX ORDER — ACYCLOVIR 400 MG/1
TABLET ORAL
Qty: 1 EACH | Refills: 5 | Status: SHIPPED | OUTPATIENT
Start: 2025-08-27